# Patient Record
Sex: FEMALE | Race: WHITE | NOT HISPANIC OR LATINO | URBAN - METROPOLITAN AREA
[De-identification: names, ages, dates, MRNs, and addresses within clinical notes are randomized per-mention and may not be internally consistent; named-entity substitution may affect disease eponyms.]

---

## 2023-08-10 ENCOUNTER — HOSPITAL ENCOUNTER (OUTPATIENT)
Dept: DATA CONVERSION | Facility: HOSPITAL | Age: 78
Discharge: HOME | End: 2023-08-10

## 2023-08-10 DIAGNOSIS — M85.89 OTHER SPECIFIED DISORDERS OF BONE DENSITY AND STRUCTURE, MULTIPLE SITES: ICD-10-CM

## 2023-08-22 ENCOUNTER — HOSPITAL ENCOUNTER (OUTPATIENT)
Dept: DATA CONVERSION | Facility: HOSPITAL | Age: 78
Discharge: HOME | End: 2023-08-22

## 2023-08-22 DIAGNOSIS — E78.5 HYPERLIPIDEMIA, UNSPECIFIED: ICD-10-CM

## 2023-08-22 LAB
ALBUMIN SERPL-MCNC: 4.3 GM/DL (ref 3.5–5)
ALBUMIN/GLOB SERPL: 1.3 RATIO (ref 1.5–3)
ALP BLD-CCNC: 91 U/L (ref 35–125)
ALT SERPL-CCNC: 12 U/L (ref 5–40)
ANION GAP SERPL CALCULATED.3IONS-SCNC: 14 MMOL/L (ref 0–19)
APPEARANCE PLAS: CLEAR
AST SERPL-CCNC: 22 U/L (ref 5–40)
BILIRUB SERPL-MCNC: 1 MG/DL (ref 0.1–1.2)
BUN SERPL-MCNC: 10 MG/DL (ref 8–25)
BUN/CREAT SERPL: 16.7 RATIO (ref 8–21)
CALCIUM SERPL-MCNC: 8.9 MG/DL (ref 8.5–10.4)
CHLORIDE SERPL-SCNC: 107 MMOL/L (ref 97–107)
CHOLEST SERPL-MCNC: 173 MG/DL (ref 133–200)
CHOLEST/HDLC SERPL: 2.7 RATIO
CO2 SERPL-SCNC: 22 MMOL/L (ref 24–31)
COLOR SPUN FLD: YELLOW
CREAT SERPL-MCNC: 0.6 MG/DL (ref 0.4–1.6)
FASTING STATUS PATIENT QL REPORTED: NORMAL
GFR SERPL CREATININE-BSD FRML MDRD: 92 ML/MIN/1.73 M2
GLOBULIN SER-MCNC: 3.3 G/DL (ref 1.9–3.7)
GLUCOSE SERPL-MCNC: 91 MG/DL (ref 65–99)
HDLC SERPL-MCNC: 65 MG/DL
LDLC SERPL CALC-MCNC: 93 MG/DL (ref 65–130)
POTASSIUM SERPL-SCNC: 4.2 MMOL/L (ref 3.4–5.1)
PROT SERPL-MCNC: 7.6 G/DL (ref 5.9–7.9)
SODIUM SERPL-SCNC: 143 MMOL/L (ref 133–145)
TRIGL SERPL-MCNC: 77 MG/DL (ref 40–150)

## 2023-10-27 DIAGNOSIS — M54.9 BACK PAIN, UNSPECIFIED BACK LOCATION, UNSPECIFIED BACK PAIN LATERALITY, UNSPECIFIED CHRONICITY: Primary | ICD-10-CM

## 2023-10-27 RX ORDER — TRAMADOL HYDROCHLORIDE 50 MG/1
50 TABLET ORAL EVERY 12 HOURS PRN
COMMUNITY
End: 2023-10-27 | Stop reason: SDUPTHER

## 2023-10-27 RX ORDER — TRAMADOL HYDROCHLORIDE 50 MG/1
50 TABLET ORAL EVERY 12 HOURS PRN
Qty: 60 TABLET | Refills: 0 | Status: SHIPPED | OUTPATIENT
Start: 2023-10-27 | End: 2023-12-01 | Stop reason: SDUPTHER

## 2023-12-01 DIAGNOSIS — M54.9 BACK PAIN, UNSPECIFIED BACK LOCATION, UNSPECIFIED BACK PAIN LATERALITY, UNSPECIFIED CHRONICITY: ICD-10-CM

## 2023-12-01 RX ORDER — TRAMADOL HYDROCHLORIDE 50 MG/1
50 TABLET ORAL EVERY 12 HOURS PRN
Qty: 60 TABLET | Refills: 0 | Status: SHIPPED | OUTPATIENT
Start: 2023-12-01 | End: 2023-12-08 | Stop reason: SDUPTHER

## 2023-12-08 ENCOUNTER — OFFICE VISIT (OUTPATIENT)
Dept: PRIMARY CARE | Facility: CLINIC | Age: 78
End: 2023-12-08
Payer: MEDICARE

## 2023-12-08 VITALS
WEIGHT: 163.2 LBS | HEIGHT: 63 IN | DIASTOLIC BLOOD PRESSURE: 70 MMHG | BODY MASS INDEX: 28.92 KG/M2 | OXYGEN SATURATION: 98 % | SYSTOLIC BLOOD PRESSURE: 152 MMHG | HEART RATE: 78 BPM

## 2023-12-08 DIAGNOSIS — M54.9 BACK PAIN, UNSPECIFIED BACK LOCATION, UNSPECIFIED BACK PAIN LATERALITY, UNSPECIFIED CHRONICITY: ICD-10-CM

## 2023-12-08 DIAGNOSIS — I10 BENIGN ESSENTIAL HYPERTENSION: ICD-10-CM

## 2023-12-08 DIAGNOSIS — M17.0 PRIMARY OSTEOARTHRITIS OF BOTH KNEES: Primary | ICD-10-CM

## 2023-12-08 PROCEDURE — 1159F MED LIST DOCD IN RCRD: CPT | Performed by: PHYSICIAN ASSISTANT

## 2023-12-08 PROCEDURE — 99213 OFFICE O/P EST LOW 20 MIN: CPT | Performed by: PHYSICIAN ASSISTANT

## 2023-12-08 PROCEDURE — 1126F AMNT PAIN NOTED NONE PRSNT: CPT | Performed by: PHYSICIAN ASSISTANT

## 2023-12-08 PROCEDURE — 3077F SYST BP >= 140 MM HG: CPT | Performed by: PHYSICIAN ASSISTANT

## 2023-12-08 PROCEDURE — 1036F TOBACCO NON-USER: CPT | Performed by: PHYSICIAN ASSISTANT

## 2023-12-08 PROCEDURE — 3078F DIAST BP <80 MM HG: CPT | Performed by: PHYSICIAN ASSISTANT

## 2023-12-08 RX ORDER — SIMVASTATIN 10 MG/1
TABLET, FILM COATED ORAL
COMMUNITY
End: 2024-03-21

## 2023-12-08 RX ORDER — TRAMADOL HYDROCHLORIDE 50 MG/1
50 TABLET ORAL EVERY 12 HOURS PRN
Qty: 60 TABLET | Refills: 1 | Status: SHIPPED | OUTPATIENT
Start: 2023-12-08 | End: 2024-01-02 | Stop reason: SDUPTHER

## 2023-12-08 RX ORDER — AMLODIPINE BESYLATE 10 MG/1
TABLET ORAL EVERY 24 HOURS
COMMUNITY
End: 2024-02-26

## 2023-12-08 RX ORDER — ALENDRONATE SODIUM 70 MG/1
TABLET ORAL
COMMUNITY

## 2023-12-08 ASSESSMENT — PAIN SCALES - GENERAL: PAINLEVEL: 0-NO PAIN

## 2023-12-08 ASSESSMENT — PATIENT HEALTH QUESTIONNAIRE - PHQ9
2. FEELING DOWN, DEPRESSED OR HOPELESS: NOT AT ALL
1. LITTLE INTEREST OR PLEASURE IN DOING THINGS: NOT AT ALL
SUM OF ALL RESPONSES TO PHQ9 QUESTIONS 1 AND 2: 0

## 2023-12-08 NOTE — PROGRESS NOTES
"Subjective   Patient ID: Kate Bearden is a 78 y.o. female who presents for med refills.    Presents for follow up - refill tramadol. States that she has more pain with rain or weather changes. Has been stable on same dose several years prior to my care as well.   Denies any other changes.   BP at home more stable though she has not checked recently.         Review of Systems   All other systems reviewed and are negative.      Objective   /70   Pulse 78   Ht 1.6 m (5' 3\")   Wt 74 kg (163 lb 3.2 oz)   SpO2 98%   BMI 28.91 kg/m²     Physical Exam  Constitutional:       Appearance: Normal appearance.   HENT:      Head: Normocephalic.      Mouth/Throat:      Pharynx: Oropharynx is clear.   Eyes:      Extraocular Movements: Extraocular movements intact.      Pupils: Pupils are equal, round, and reactive to light.   Cardiovascular:      Rate and Rhythm: Normal rate and regular rhythm.   Pulmonary:      Breath sounds: Normal breath sounds.   Neurological:      Mental Status: She is alert.         Assessment/Plan   Diagnoses and all orders for this visit:  Primary osteoarthritis of both knees  Benign essential hypertension  Back pain, unspecified back location, unspecified back pain laterality, unspecified chronicity         "

## 2024-01-02 DIAGNOSIS — M54.9 BACK PAIN, UNSPECIFIED BACK LOCATION, UNSPECIFIED BACK PAIN LATERALITY, UNSPECIFIED CHRONICITY: ICD-10-CM

## 2024-01-02 RX ORDER — TRAMADOL HYDROCHLORIDE 50 MG/1
50 TABLET ORAL EVERY 12 HOURS PRN
Qty: 60 TABLET | Refills: 1 | Status: SHIPPED | OUTPATIENT
Start: 2024-01-02 | End: 2024-02-29 | Stop reason: SDUPTHER

## 2024-01-02 NOTE — TELEPHONE ENCOUNTER
Pt called stating her previous Rx request was canceled - she states she has been out of medication for x5 days - she states she is in a lot of pain, especially when it rains - she is requesting refill asap

## 2024-02-23 DIAGNOSIS — I10 BENIGN ESSENTIAL HYPERTENSION: Primary | ICD-10-CM

## 2024-02-26 RX ORDER — AMLODIPINE BESYLATE 10 MG/1
10 TABLET ORAL DAILY
Qty: 100 TABLET | Refills: 2 | Status: SHIPPED | OUTPATIENT
Start: 2024-02-26

## 2024-02-29 DIAGNOSIS — M54.9 BACK PAIN, UNSPECIFIED BACK LOCATION, UNSPECIFIED BACK PAIN LATERALITY, UNSPECIFIED CHRONICITY: ICD-10-CM

## 2024-03-01 RX ORDER — TRAMADOL HYDROCHLORIDE 50 MG/1
50 TABLET ORAL EVERY 12 HOURS PRN
Qty: 60 TABLET | Refills: 0 | Status: SHIPPED | OUTPATIENT
Start: 2024-03-01 | End: 2024-03-28 | Stop reason: SDUPTHER

## 2024-03-01 NOTE — TELEPHONE ENCOUNTER
She's due for visit for further fills - unable to send electronically. Ok to call in or can print and

## 2024-03-21 DIAGNOSIS — E78.1 PURE HYPERTRIGLYCERIDEMIA: Primary | ICD-10-CM

## 2024-03-21 RX ORDER — SIMVASTATIN 10 MG/1
10 TABLET, FILM COATED ORAL EVERY EVENING
Qty: 100 TABLET | Refills: 2 | Status: SHIPPED | OUTPATIENT
Start: 2024-03-21

## 2024-03-28 DIAGNOSIS — M54.9 BACK PAIN, UNSPECIFIED BACK LOCATION, UNSPECIFIED BACK PAIN LATERALITY, UNSPECIFIED CHRONICITY: ICD-10-CM

## 2024-03-29 RX ORDER — TRAMADOL HYDROCHLORIDE 50 MG/1
50 TABLET ORAL EVERY 12 HOURS PRN
Qty: 60 TABLET | Refills: 0 | Status: SHIPPED | OUTPATIENT
Start: 2024-03-29 | End: 2024-04-26 | Stop reason: SDUPTHER

## 2024-04-26 DIAGNOSIS — M54.9 BACK PAIN, UNSPECIFIED BACK LOCATION, UNSPECIFIED BACK PAIN LATERALITY, UNSPECIFIED CHRONICITY: ICD-10-CM

## 2024-04-26 RX ORDER — TRAMADOL HYDROCHLORIDE 50 MG/1
50 TABLET ORAL EVERY 12 HOURS PRN
Qty: 60 TABLET | Refills: 0 | Status: SHIPPED | OUTPATIENT
Start: 2024-04-26 | End: 2024-05-13 | Stop reason: SDUPTHER

## 2024-05-13 ENCOUNTER — OFFICE VISIT (OUTPATIENT)
Dept: PRIMARY CARE | Facility: CLINIC | Age: 79
End: 2024-05-13
Payer: MEDICARE

## 2024-05-13 VITALS
HEART RATE: 73 BPM | BODY MASS INDEX: 31.83 KG/M2 | SYSTOLIC BLOOD PRESSURE: 134 MMHG | OXYGEN SATURATION: 95 % | DIASTOLIC BLOOD PRESSURE: 70 MMHG | WEIGHT: 173 LBS | HEIGHT: 62 IN

## 2024-05-13 DIAGNOSIS — M17.0 PRIMARY OSTEOARTHRITIS OF BOTH KNEES: ICD-10-CM

## 2024-05-13 DIAGNOSIS — I10 BENIGN ESSENTIAL HYPERTENSION: ICD-10-CM

## 2024-05-13 DIAGNOSIS — M54.9 BACK PAIN, UNSPECIFIED BACK LOCATION, UNSPECIFIED BACK PAIN LATERALITY, UNSPECIFIED CHRONICITY: Primary | ICD-10-CM

## 2024-05-13 DIAGNOSIS — E78.00 PURE HYPERCHOLESTEROLEMIA: ICD-10-CM

## 2024-05-13 PROCEDURE — 99214 OFFICE O/P EST MOD 30 MIN: CPT | Performed by: PHYSICIAN ASSISTANT

## 2024-05-13 PROCEDURE — 3078F DIAST BP <80 MM HG: CPT | Performed by: PHYSICIAN ASSISTANT

## 2024-05-13 PROCEDURE — 1160F RVW MEDS BY RX/DR IN RCRD: CPT | Performed by: PHYSICIAN ASSISTANT

## 2024-05-13 PROCEDURE — 1159F MED LIST DOCD IN RCRD: CPT | Performed by: PHYSICIAN ASSISTANT

## 2024-05-13 PROCEDURE — 3075F SYST BP GE 130 - 139MM HG: CPT | Performed by: PHYSICIAN ASSISTANT

## 2024-05-13 PROCEDURE — 1036F TOBACCO NON-USER: CPT | Performed by: PHYSICIAN ASSISTANT

## 2024-05-13 PROCEDURE — 1126F AMNT PAIN NOTED NONE PRSNT: CPT | Performed by: PHYSICIAN ASSISTANT

## 2024-05-13 PROCEDURE — 1158F ADVNC CARE PLAN TLK DOCD: CPT | Performed by: PHYSICIAN ASSISTANT

## 2024-05-13 PROCEDURE — 1123F ACP DISCUSS/DSCN MKR DOCD: CPT | Performed by: PHYSICIAN ASSISTANT

## 2024-05-13 RX ORDER — TRAMADOL HYDROCHLORIDE 50 MG/1
50 TABLET ORAL EVERY 12 HOURS PRN
Qty: 60 TABLET | Refills: 2 | Status: SHIPPED | OUTPATIENT
Start: 2024-05-25

## 2024-05-13 ASSESSMENT — PATIENT HEALTH QUESTIONNAIRE - PHQ9
SUM OF ALL RESPONSES TO PHQ9 QUESTIONS 1 AND 2: 0
1. LITTLE INTEREST OR PLEASURE IN DOING THINGS: NOT AT ALL
2. FEELING DOWN, DEPRESSED OR HOPELESS: NOT AT ALL

## 2024-05-13 ASSESSMENT — PAIN SCALES - GENERAL: PAINLEVEL: 0-NO PAIN

## 2024-05-13 NOTE — PROGRESS NOTES
"Subjective   Patient ID: Kate Bearden is a 78 y.o. female who presents for Follow-up and Med Refill.    Presents for follow up - refill tramadol. States that she has more pain with rain or weather changes. Has been stable on same dose several years prior to my care as well.   Denies any other changes.   BP at home more stable though she has not checked recently.  Tolerating alendronate well.     Med Refill         Review of Systems   All other systems reviewed and are negative.      Objective   /70   Pulse 73   Ht 1.575 m (5' 2\")   Wt 78.5 kg (173 lb)   SpO2 95%   BMI 31.64 kg/m²     Physical Exam  Constitutional:       Appearance: Normal appearance.   HENT:      Head: Normocephalic and atraumatic.      Mouth/Throat:      Pharynx: Oropharynx is clear.   Cardiovascular:      Rate and Rhythm: Normal rate and regular rhythm.      Heart sounds: Normal heart sounds.   Pulmonary:      Breath sounds: Normal breath sounds.   Neurological:      Mental Status: She is alert.         Assessment/Plan   Diagnoses and all orders for this visit:  Back pain, unspecified back location, unspecified back pain laterality, unspecified chronicity  -     traMADol (Ultram) 50 mg tablet; Take 1 tablet (50 mg) by mouth every 12 hours if needed for moderate pain (4 - 6). Do not fill before May 25, 2024.  Primary osteoarthritis of both knees  Benign essential hypertension  Pure hypercholesterolemia  -     Lipid panel; Future  -     Comprehensive metabolic panel; Future         "

## 2024-07-05 DIAGNOSIS — M81.8 OTHER OSTEOPOROSIS WITHOUT CURRENT PATHOLOGICAL FRACTURE: Primary | ICD-10-CM

## 2024-07-08 RX ORDER — ALENDRONATE SODIUM 70 MG/1
TABLET ORAL
Qty: 4 TABLET | Refills: 11 | Status: SHIPPED | OUTPATIENT
Start: 2024-07-08

## 2024-08-13 ENCOUNTER — LAB (OUTPATIENT)
Dept: LAB | Facility: LAB | Age: 79
End: 2024-08-13
Payer: MEDICARE

## 2024-08-13 DIAGNOSIS — E78.00 PURE HYPERCHOLESTEROLEMIA: ICD-10-CM

## 2024-08-13 LAB
ALBUMIN SERPL BCP-MCNC: 4.5 G/DL (ref 3.4–5)
ALP SERPL-CCNC: 63 U/L (ref 33–136)
ALT SERPL W P-5'-P-CCNC: 15 U/L (ref 7–45)
ANION GAP SERPL CALC-SCNC: 13 MMOL/L (ref 10–20)
AST SERPL W P-5'-P-CCNC: 19 U/L (ref 9–39)
BILIRUB SERPL-MCNC: 1.2 MG/DL (ref 0–1.2)
BUN SERPL-MCNC: 13 MG/DL (ref 6–23)
CALCIUM SERPL-MCNC: 9.1 MG/DL (ref 8.6–10.3)
CHLORIDE SERPL-SCNC: 107 MMOL/L (ref 98–107)
CHOLEST SERPL-MCNC: 141 MG/DL (ref 0–199)
CHOLESTEROL/HDL RATIO: 2.5
CO2 SERPL-SCNC: 27 MMOL/L (ref 21–32)
CREAT SERPL-MCNC: 0.7 MG/DL (ref 0.5–1.05)
EGFRCR SERPLBLD CKD-EPI 2021: 89 ML/MIN/1.73M*2
GLUCOSE SERPL-MCNC: 92 MG/DL (ref 74–99)
HDLC SERPL-MCNC: 55.6 MG/DL
LDLC SERPL CALC-MCNC: 67 MG/DL
NON HDL CHOLESTEROL: 85 MG/DL (ref 0–149)
POTASSIUM SERPL-SCNC: 4.2 MMOL/L (ref 3.5–5.3)
PROT SERPL-MCNC: 7.3 G/DL (ref 6.4–8.2)
SODIUM SERPL-SCNC: 143 MMOL/L (ref 136–145)
TRIGL SERPL-MCNC: 91 MG/DL (ref 0–149)
VLDL: 18 MG/DL (ref 0–40)

## 2024-08-13 PROCEDURE — 36415 COLL VENOUS BLD VENIPUNCTURE: CPT

## 2024-08-13 PROCEDURE — 80061 LIPID PANEL: CPT

## 2024-08-13 PROCEDURE — 80053 COMPREHEN METABOLIC PANEL: CPT

## 2024-08-28 DIAGNOSIS — M54.9 BACK PAIN, UNSPECIFIED BACK LOCATION, UNSPECIFIED BACK PAIN LATERALITY, UNSPECIFIED CHRONICITY: ICD-10-CM

## 2024-08-30 RX ORDER — TRAMADOL HYDROCHLORIDE 50 MG/1
50 TABLET ORAL EVERY 12 HOURS PRN
Qty: 60 TABLET | Refills: 0 | Status: SHIPPED | OUTPATIENT
Start: 2024-08-30

## 2024-09-27 ENCOUNTER — CLINICAL SUPPORT (OUTPATIENT)
Dept: PRIMARY CARE | Facility: CLINIC | Age: 79
End: 2024-09-27
Payer: MEDICARE

## 2024-09-27 DIAGNOSIS — Z23 ENCOUNTER FOR IMMUNIZATION: ICD-10-CM

## 2024-09-27 PROCEDURE — 90662 IIV NO PRSV INCREASED AG IM: CPT | Performed by: PHYSICIAN ASSISTANT

## 2024-09-30 DIAGNOSIS — M54.9 BACK PAIN, UNSPECIFIED BACK LOCATION, UNSPECIFIED BACK PAIN LATERALITY, UNSPECIFIED CHRONICITY: ICD-10-CM

## 2024-09-30 RX ORDER — TRAMADOL HYDROCHLORIDE 50 MG/1
50 TABLET ORAL EVERY 12 HOURS PRN
Qty: 60 TABLET | Refills: 0 | Status: SHIPPED | OUTPATIENT
Start: 2024-09-30

## 2024-10-30 DIAGNOSIS — M54.9 BACK PAIN, UNSPECIFIED BACK LOCATION, UNSPECIFIED BACK PAIN LATERALITY, UNSPECIFIED CHRONICITY: ICD-10-CM

## 2024-11-01 RX ORDER — TRAMADOL HYDROCHLORIDE 50 MG/1
50 TABLET ORAL EVERY 12 HOURS PRN
Qty: 60 TABLET | Refills: 0 | Status: SHIPPED | OUTPATIENT
Start: 2024-11-01

## 2024-11-04 DIAGNOSIS — I10 BENIGN ESSENTIAL HYPERTENSION: ICD-10-CM

## 2024-11-05 RX ORDER — AMLODIPINE BESYLATE 10 MG/1
10 TABLET ORAL DAILY
Qty: 100 TABLET | Refills: 2 | Status: SHIPPED | OUTPATIENT
Start: 2024-11-05

## 2024-11-21 ENCOUNTER — OFFICE VISIT (OUTPATIENT)
Dept: PRIMARY CARE | Facility: CLINIC | Age: 79
End: 2024-11-21
Payer: MEDICARE

## 2024-11-21 VITALS
HEIGHT: 63 IN | SYSTOLIC BLOOD PRESSURE: 138 MMHG | HEART RATE: 75 BPM | DIASTOLIC BLOOD PRESSURE: 80 MMHG | WEIGHT: 177 LBS | OXYGEN SATURATION: 95 % | BODY MASS INDEX: 31.36 KG/M2

## 2024-11-21 DIAGNOSIS — M54.9 BACK PAIN, UNSPECIFIED BACK LOCATION, UNSPECIFIED BACK PAIN LATERALITY, UNSPECIFIED CHRONICITY: ICD-10-CM

## 2024-11-21 DIAGNOSIS — M17.0 PRIMARY OSTEOARTHRITIS OF BOTH KNEES: Primary | ICD-10-CM

## 2024-11-21 PROCEDURE — 3075F SYST BP GE 130 - 139MM HG: CPT | Performed by: PHYSICIAN ASSISTANT

## 2024-11-21 PROCEDURE — 1123F ACP DISCUSS/DSCN MKR DOCD: CPT | Performed by: PHYSICIAN ASSISTANT

## 2024-11-21 PROCEDURE — 99213 OFFICE O/P EST LOW 20 MIN: CPT | Performed by: PHYSICIAN ASSISTANT

## 2024-11-21 PROCEDURE — 1126F AMNT PAIN NOTED NONE PRSNT: CPT | Performed by: PHYSICIAN ASSISTANT

## 2024-11-21 PROCEDURE — 3079F DIAST BP 80-89 MM HG: CPT | Performed by: PHYSICIAN ASSISTANT

## 2024-11-21 PROCEDURE — 1036F TOBACCO NON-USER: CPT | Performed by: PHYSICIAN ASSISTANT

## 2024-11-21 PROCEDURE — 1159F MED LIST DOCD IN RCRD: CPT | Performed by: PHYSICIAN ASSISTANT

## 2024-11-21 PROCEDURE — 1158F ADVNC CARE PLAN TLK DOCD: CPT | Performed by: PHYSICIAN ASSISTANT

## 2024-11-21 RX ORDER — TRAMADOL HYDROCHLORIDE 50 MG/1
50 TABLET ORAL EVERY 12 HOURS PRN
Qty: 60 TABLET | Refills: 2 | Status: SHIPPED | OUTPATIENT
Start: 2024-11-21

## 2024-11-21 ASSESSMENT — PAIN SCALES - GENERAL: PAINLEVEL_OUTOF10: 0-NO PAIN

## 2024-11-21 NOTE — PROGRESS NOTES
"Subjective   Patient ID: Kate Bearden is a 79 y.o. female who presents for Med Refill.    Presents for follow up - refill tramadol. States that she has more pain with rain or weather changes. Has been stable on same dose several years prior to my care as well.   Denies any other changes.   BP at home more stable though she has not checked recently.  Tolerating alendronate well.     Med Refill         Review of Systems   All other systems reviewed and are negative.      Objective   /80   Pulse 75   Ht 1.6 m (5' 3\")   Wt 80.3 kg (177 lb)   SpO2 95%   BMI 31.35 kg/m²     Physical Exam  Constitutional:       Appearance: Normal appearance.   Cardiovascular:      Rate and Rhythm: Normal rate and regular rhythm.   Pulmonary:      Breath sounds: Normal breath sounds.   Neurological:      Mental Status: She is alert.         Assessment/Plan   Diagnoses and all orders for this visit:  Primary osteoarthritis of both knees  Back pain, unspecified back location, unspecified back pain laterality, unspecified chronicity  -     traMADol (Ultram) 50 mg tablet; Take 1 tablet (50 mg) by mouth every 12 hours if needed for moderate pain (4 - 6).    3 month AWV     "

## 2024-12-02 DIAGNOSIS — E78.1 PURE HYPERTRIGLYCERIDEMIA: ICD-10-CM

## 2024-12-03 RX ORDER — SIMVASTATIN 10 MG/1
10 TABLET, FILM COATED ORAL EVERY EVENING
Qty: 100 TABLET | Refills: 2 | Status: SHIPPED | OUTPATIENT
Start: 2024-12-03

## 2025-01-27 DIAGNOSIS — E78.1 PURE HYPERTRIGLYCERIDEMIA: ICD-10-CM

## 2025-01-27 DIAGNOSIS — I10 BENIGN ESSENTIAL HYPERTENSION: ICD-10-CM

## 2025-01-27 RX ORDER — SIMVASTATIN 10 MG/1
10 TABLET, FILM COATED ORAL EVERY EVENING
Qty: 100 TABLET | Refills: 2 | Status: SHIPPED | OUTPATIENT
Start: 2025-01-27

## 2025-01-27 RX ORDER — AMLODIPINE BESYLATE 10 MG/1
10 TABLET ORAL DAILY
Qty: 100 TABLET | Refills: 2 | Status: SHIPPED | OUTPATIENT
Start: 2025-01-27

## 2025-02-14 ENCOUNTER — TELEPHONE (OUTPATIENT)
Dept: PRIMARY CARE | Facility: CLINIC | Age: 80
End: 2025-02-14
Payer: MEDICARE

## 2025-02-14 DIAGNOSIS — M54.9 BACK PAIN, UNSPECIFIED BACK LOCATION, UNSPECIFIED BACK PAIN LATERALITY, UNSPECIFIED CHRONICITY: ICD-10-CM

## 2025-02-14 RX ORDER — TRAMADOL HYDROCHLORIDE 50 MG/1
50 TABLET ORAL EVERY 12 HOURS PRN
Qty: 60 TABLET | Refills: 0 | Status: SHIPPED | OUTPATIENT
Start: 2025-02-14

## 2025-03-05 ENCOUNTER — OFFICE VISIT (OUTPATIENT)
Dept: PRIMARY CARE | Facility: CLINIC | Age: 80
End: 2025-03-05
Payer: MEDICARE

## 2025-03-05 VITALS
HEART RATE: 122 BPM | DIASTOLIC BLOOD PRESSURE: 80 MMHG | HEIGHT: 63 IN | SYSTOLIC BLOOD PRESSURE: 136 MMHG | WEIGHT: 166 LBS | BODY MASS INDEX: 29.41 KG/M2 | OXYGEN SATURATION: 97 %

## 2025-03-05 DIAGNOSIS — M53.3 CHRONIC LEFT SI JOINT PAIN: ICD-10-CM

## 2025-03-05 DIAGNOSIS — E78.00 PURE HYPERCHOLESTEROLEMIA: ICD-10-CM

## 2025-03-05 DIAGNOSIS — I10 BENIGN ESSENTIAL HYPERTENSION: ICD-10-CM

## 2025-03-05 DIAGNOSIS — G89.29 CHRONIC LEFT SI JOINT PAIN: ICD-10-CM

## 2025-03-05 DIAGNOSIS — Z00.00 ROUTINE GENERAL MEDICAL EXAMINATION AT HEALTH CARE FACILITY: Primary | ICD-10-CM

## 2025-03-05 DIAGNOSIS — M54.9 BACK PAIN, UNSPECIFIED BACK LOCATION, UNSPECIFIED BACK PAIN LATERALITY, UNSPECIFIED CHRONICITY: ICD-10-CM

## 2025-03-05 DIAGNOSIS — M17.0 PRIMARY OSTEOARTHRITIS OF BOTH KNEES: ICD-10-CM

## 2025-03-05 PROCEDURE — 1126F AMNT PAIN NOTED NONE PRSNT: CPT | Performed by: PHYSICIAN ASSISTANT

## 2025-03-05 PROCEDURE — 1036F TOBACCO NON-USER: CPT | Performed by: PHYSICIAN ASSISTANT

## 2025-03-05 PROCEDURE — 1159F MED LIST DOCD IN RCRD: CPT | Performed by: PHYSICIAN ASSISTANT

## 2025-03-05 PROCEDURE — 99214 OFFICE O/P EST MOD 30 MIN: CPT | Performed by: PHYSICIAN ASSISTANT

## 2025-03-05 PROCEDURE — 1123F ACP DISCUSS/DSCN MKR DOCD: CPT | Performed by: PHYSICIAN ASSISTANT

## 2025-03-05 PROCEDURE — 99215 OFFICE O/P EST HI 40 MIN: CPT | Mod: 25 | Performed by: PHYSICIAN ASSISTANT

## 2025-03-05 PROCEDURE — 3079F DIAST BP 80-89 MM HG: CPT | Performed by: PHYSICIAN ASSISTANT

## 2025-03-05 PROCEDURE — 3075F SYST BP GE 130 - 139MM HG: CPT | Performed by: PHYSICIAN ASSISTANT

## 2025-03-05 PROCEDURE — 1170F FXNL STATUS ASSESSED: CPT | Performed by: PHYSICIAN ASSISTANT

## 2025-03-05 PROCEDURE — G0439 PPPS, SUBSEQ VISIT: HCPCS | Performed by: PHYSICIAN ASSISTANT

## 2025-03-05 RX ORDER — TRAMADOL HYDROCHLORIDE 50 MG/1
50 TABLET ORAL EVERY 12 HOURS PRN
Qty: 60 TABLET | Refills: 2 | Status: SHIPPED | OUTPATIENT
Start: 2025-03-13

## 2025-03-05 ASSESSMENT — ACTIVITIES OF DAILY LIVING (ADL)
BATHING: INDEPENDENT
TOILETING: INDEPENDENT
DRESSING: INDEPENDENT
EATING: INDEPENDENT
GROCERY_SHOPPING: INDEPENDENT
NEEDS ASSISTANCE WITH FOOD: INDEPENDENT
JUDGMENT_ADEQUATE_SAFELY_COMPLETE_DAILY_ACTIVITIES: YES
USING TELEPHONE: INDEPENDENT
MANAGING_FINANCES: INDEPENDENT
STIL DRIVING: YES
ADEQUATE_TO_COMPLETE_ADL: YES
USING TRANSPORTATION: INDEPENDENT
DOING_HOUSEWORK: INDEPENDENT
TAKING_MEDICATION: INDEPENDENT
FEEDING: INDEPENDENT
PREPARING MEALS: INDEPENDENT

## 2025-03-05 ASSESSMENT — PATIENT HEALTH QUESTIONNAIRE - PHQ9
1. LITTLE INTEREST OR PLEASURE IN DOING THINGS: NOT AT ALL
SUM OF ALL RESPONSES TO PHQ9 QUESTIONS 1 AND 2: 0
2. FEELING DOWN, DEPRESSED OR HOPELESS: NOT AT ALL

## 2025-03-05 ASSESSMENT — COGNITIVE AND FUNCTIONAL STATUS - GENERAL: VERBAL FLUENCY - ANIMAL NAMES (0 TO 25): 3

## 2025-03-05 ASSESSMENT — PAIN SCALES - GENERAL: PAINLEVEL_OUTOF10: 0-NO PAIN

## 2025-03-05 NOTE — PROGRESS NOTES
Subjective   Reason for Visit: Kate Bearden is an 79 y.o. female here for a Medicare Wellness visit.     Past Medical, Surgical, and Family History reviewed and updated in chart.    Reviewed all medications by prescribing practitioner or clinical pharmacist (such as prescriptions, OTCs, herbal therapies and supplements) and documented in the medical record.    [facility]    ---      Kate Bearden is a 79 y.o. female that is presenting today for Annual Exam.    Concerns: ongoing L lower back pain    Subjective: pain to L lower back SI joint region.     Other Providers: none    Hearing Changes: no    Cognitive Assessment: mini-cog    Depression Screening: negative     ACTIVITIES OF DAILY LIVING:  Basic ADLs:  Problems with Bathing, Dressing, Toileting, Transferring, Continence, Feeding No  Instrumental ADLs:  No problems with Ability to use phone, Shopping, Cooking, House-keeping, Laundry, Transportation, Medication Management, Finance Management No    Advanced Care Planning was discussed with patient:  The patient has an active advanced care plan on file. The patient has an active surrogate decision-maker on file.    History   History reviewed. No pertinent past medical history.  History reviewed. No pertinent surgical history.  No family history on file.    No Known Allergies  Current Outpatient Medications on File Prior to Visit:  alendronate (Fosamax) 70 mg tablet, TAKE 1 TABLET BY MOUTH 30 MINUTES BEFORE THE 1ST FOOD , BEVERAGE OR MEDICINE OF THE DAY WITH PLAIN WATER as directed, Disp: 4 tablet, Rfl: 11  amLODIPine (Norvasc) 10 mg tablet, Take 1 tablet (10 mg) by mouth once daily., Disp: 100 tablet, Rfl: 2  simvastatin (Zocor) 10 mg tablet, Take 1 tablet (10 mg) by mouth once daily in the evening., Disp: 100 tablet, Rfl: 2  traMADol (Ultram) 50 mg tablet, Take 1 tablet (50 mg) by mouth every 12 hours if needed for moderate pain (4 - 6)., Disp:  "60 tablet, Rfl: 0    No current facility-administered medications on file prior to visit.      Immunization History  Administered            Date(s) Administered    Flu vaccine, trivalent, preservative free, HIGH-DOSE, age 65y+ (Fluzone)                          09/27/2024      Moderna SARS-CoV-2 Vaccination                          03/19/2021  04/15/2021  06/13/2022    Patient's medical history was reviewed and updated either before or during this encounter.    Objective  --------------------            03/05/25               1014     --------------------   BP:       136/80     Pulse:   (!) 122     SpO2:      97%      --------------------   [unfilled]  Mobility Assessment: get up and go test <30 seconds- Yes.       Assessment/Plan   Diagnoses and associated orders for this visit:    · Routine general medical examination at health care facility   1 Year Follow Up In Primary Care - Wellness Exam; Future    Patient Active Problem List:     Back pain     Primary osteoarthritis of both knees     Benign essential hypertension     Pure hypercholesterolemia    Advance Care Planning  Diagnoses and associated orders for this visit:    · Routine general medical examination at [facility] care facility   1 Year Follow Up In Primary Care - Wellness Exam; Future    The patient was encouraged to ensure that any/all documentation is accurate and up to date, and that our office be provided a copy in the event that anything changes.    Lokesh Macias PA-C         Patient Care Team:  Lokesh Macias PA-C as PCP - General (Family Medicine)  Lokesh Macias PA-C as Primary Care Provider     Review of Systems   All other systems reviewed and are negative.      Objective   Vitals:  /80   Pulse (!) 122   Ht 1.6 m (5' 3\")   Wt 75.3 kg (166 lb)   SpO2 97%   BMI 29.41 kg/m²       Physical Exam  Constitutional:       Appearance: Normal appearance.   HENT:      Mouth/Throat:      Pharynx: Oropharynx is " clear.   Eyes:      Pupils: Pupils are equal, round, and reactive to light.   Cardiovascular:      Rate and Rhythm: Normal rate and regular rhythm.      Heart sounds: Normal heart sounds.   Pulmonary:      Breath sounds: Normal breath sounds.   Musculoskeletal:      Comments: L SI joint and L lateral lower back tenderness   Neurological:      Mental Status: She is alert.         Assessment & Plan  Routine general medical examination at health care facility    Orders:    1 Year Follow Up In Primary Care - Wellness Exam; Future    Back pain, unspecified back location, unspecified back pain laterality, unspecified chronicity    Orders:    Referral to Pain Medicine; Future    traMADol (Ultram) 50 mg tablet; Take 1 tablet (50 mg) by mouth every 12 hours if needed for moderate pain (4 - 6). Do not fill before March 13, 2025.    Primary osteoarthritis of both knees         Pure hypercholesterolemia         Benign essential hypertension         Chronic left SI joint pain    Orders:    Referral to Pain Medicine; Future       Follow up in 3 months.

## 2025-03-05 NOTE — ASSESSMENT & PLAN NOTE
Orders:    Referral to Pain Medicine; Future    traMADol (Ultram) 50 mg tablet; Take 1 tablet (50 mg) by mouth every 12 hours if needed for moderate pain (4 - 6). Do not fill before March 13, 2025.

## 2025-04-14 ENCOUNTER — HOSPITAL ENCOUNTER (OUTPATIENT)
Dept: RADIOLOGY | Facility: CLINIC | Age: 80
Discharge: HOME | End: 2025-04-14
Payer: MEDICARE

## 2025-04-14 ENCOUNTER — OFFICE VISIT (OUTPATIENT)
Dept: PAIN MEDICINE | Facility: CLINIC | Age: 80
End: 2025-04-14
Payer: MEDICARE

## 2025-04-14 VITALS — HEART RATE: 110 BPM | SYSTOLIC BLOOD PRESSURE: 108 MMHG | OXYGEN SATURATION: 98 % | DIASTOLIC BLOOD PRESSURE: 60 MMHG

## 2025-04-14 DIAGNOSIS — G89.29 CHRONIC LEFT SI JOINT PAIN: ICD-10-CM

## 2025-04-14 DIAGNOSIS — M54.9 BACK PAIN, UNSPECIFIED BACK LOCATION, UNSPECIFIED BACK PAIN LATERALITY, UNSPECIFIED CHRONICITY: ICD-10-CM

## 2025-04-14 DIAGNOSIS — M53.3 CHRONIC LEFT SI JOINT PAIN: ICD-10-CM

## 2025-04-14 DIAGNOSIS — M25.552 LEFT HIP PAIN: Primary | ICD-10-CM

## 2025-04-14 PROCEDURE — 1123F ACP DISCUSS/DSCN MKR DOCD: CPT | Performed by: ANESTHESIOLOGY

## 2025-04-14 PROCEDURE — 99204 OFFICE O/P NEW MOD 45 MIN: CPT | Performed by: ANESTHESIOLOGY

## 2025-04-14 PROCEDURE — 1160F RVW MEDS BY RX/DR IN RCRD: CPT | Performed by: ANESTHESIOLOGY

## 2025-04-14 PROCEDURE — 73502 X-RAY EXAM HIP UNI 2-3 VIEWS: CPT | Mod: LT

## 2025-04-14 PROCEDURE — 3074F SYST BP LT 130 MM HG: CPT | Performed by: ANESTHESIOLOGY

## 2025-04-14 PROCEDURE — 3078F DIAST BP <80 MM HG: CPT | Performed by: ANESTHESIOLOGY

## 2025-04-14 PROCEDURE — 72100 X-RAY EXAM L-S SPINE 2/3 VWS: CPT

## 2025-04-14 PROCEDURE — 1159F MED LIST DOCD IN RCRD: CPT | Performed by: ANESTHESIOLOGY

## 2025-04-14 PROCEDURE — 99214 OFFICE O/P EST MOD 30 MIN: CPT | Performed by: ANESTHESIOLOGY

## 2025-04-14 PROCEDURE — G2211 COMPLEX E/M VISIT ADD ON: HCPCS | Performed by: ANESTHESIOLOGY

## 2025-04-14 PROCEDURE — 1036F TOBACCO NON-USER: CPT | Performed by: ANESTHESIOLOGY

## 2025-04-14 PROCEDURE — 1125F AMNT PAIN NOTED PAIN PRSNT: CPT | Performed by: ANESTHESIOLOGY

## 2025-04-14 PROCEDURE — 72100 X-RAY EXAM L-S SPINE 2/3 VWS: CPT | Performed by: RADIOLOGY

## 2025-04-14 ASSESSMENT — ENCOUNTER SYMPTOMS
PSYCHIATRIC NEGATIVE: 1
ENDOCRINE NEGATIVE: 1
OCCASIONAL FEELINGS OF UNSTEADINESS: 0
EYES NEGATIVE: 1
LOSS OF SENSATION IN FEET: 0
HEMATOLOGIC/LYMPHATIC NEGATIVE: 1
NEUROLOGICAL NEGATIVE: 1
DEPRESSION: 0
CONSTITUTIONAL NEGATIVE: 1
RESPIRATORY NEGATIVE: 1
GASTROINTESTINAL NEGATIVE: 1
ARTHRALGIAS: 1
CARDIOVASCULAR NEGATIVE: 1

## 2025-04-14 ASSESSMENT — PATIENT HEALTH QUESTIONNAIRE - PHQ9
SUM OF ALL RESPONSES TO PHQ9 QUESTIONS 1 AND 2: 0
2. FEELING DOWN, DEPRESSED OR HOPELESS: NOT AT ALL
1. LITTLE INTEREST OR PLEASURE IN DOING THINGS: NOT AT ALL

## 2025-04-14 ASSESSMENT — PAIN DESCRIPTION - DESCRIPTORS: DESCRIPTORS: SHARP

## 2025-04-14 ASSESSMENT — LIFESTYLE VARIABLES: TOTAL SCORE: 0

## 2025-04-14 ASSESSMENT — PAIN SCALES - GENERAL
PAINLEVEL_OUTOF10: 3
PAINLEVEL_OUTOF10: 3

## 2025-04-14 ASSESSMENT — PAIN - FUNCTIONAL ASSESSMENT: PAIN_FUNCTIONAL_ASSESSMENT: 0-10

## 2025-04-14 NOTE — PROGRESS NOTES
PAIN MANAGEMENT NEW PATIENT OFFICE NOTE    Date of Service: 4/14/2025    SUBJECTIVE    CHIEF COMPLAINT: L hip pain    HISTORY OF PRESENT ILLNESS    Kate Bearden is a 79 y.o. female with PMH HTN, OA who presents as new patient referred by Lokesh Macias PA-C with L hip pain.    Pt describes L hip pain since 2023 when she was dx'd with osteopenia. Pain radiates to L groin occasionally . Pain is worse with standing/walking. Pain is alleviated with sitting down. Returns at night.  Pt has tried Tylenol, tramadol without sustained relief    Pt denies new-onset numbness, weakness, bowel/bladder incontinence.  Pt denies recent infection, allergy to Latex/iodine/contrast. Patient is currently taking the following blood thinner(s): N/A    REVIEW OF SYSTEMS  Review of Systems   Constitutional: Negative.    HENT: Negative.     Eyes: Negative.    Respiratory: Negative.     Cardiovascular: Negative.    Gastrointestinal: Negative.    Endocrine: Negative.    Musculoskeletal:  Positive for arthralgias.   Skin: Negative.    Neurological: Negative.    Hematological: Negative.    Psychiatric/Behavioral: Negative.         PAST MEDICAL HISTORY  History reviewed. No pertinent past medical history.  History reviewed. No pertinent surgical history.  No family history on file.    CURRENT MEDICATIONS  Current Outpatient Medications   Medication Sig Dispense Refill    alendronate (Fosamax) 70 mg tablet TAKE 1 TABLET BY MOUTH 30 MINUTES BEFORE THE 1ST FOOD , BEVERAGE OR MEDICINE OF THE DAY WITH PLAIN WATER as directed 4 tablet 11    amLODIPine (Norvasc) 10 mg tablet Take 1 tablet (10 mg) by mouth once daily. 100 tablet 2    simvastatin (Zocor) 10 mg tablet Take 1 tablet (10 mg) by mouth once daily in the evening. 100 tablet 2    traMADol (Ultram) 50 mg tablet Take 1 tablet (50 mg) by mouth every 12 hours if needed for moderate pain (4 - 6). Do not fill before March 13, 2025. 60 tablet 2     No current facility-administered  medications for this visit.       ALLERGIES AND DRUG REACTIONS  No Known Allergies       OBJECTIVE  Visit Vitals  /60   Pulse 110   SpO2 98%   Smoking Status Never       Last Recorded Pain Score (if available):           Pain Score:   3     Physical Exam  Vitals and nursing note reviewed.     General: Sitting in chair, NAD  Head: NCAT  Eyes: Sclera/conjunctiva clear, EOMI, PERRL  Nose/mouth: MMM  CV: Good distal pulses  Lungs: Good/equal chest excursion  Abdomen: Soft, ND  Ext: No cyanosis/edema  MSK: L-spine alignment: unremarkable, L paraspinal m TTP, L PSIS TTP, L-spine ROM: extension lmtd by pain  L hip: neg log roll. No leg-length discrepancy.  +pain on internal rotation      Neuro: AAOx3, CN grossly nl   \  Psych: affect nl  Skin: no rash/lesions      REVIEW OF LABORATORY DATA  I have reviewed the following lab results:  WBC   Date Value Ref Range Status   07/02/2019 7.7 4.5 - 11.0 K/UL Final     RBC   Date Value Ref Range Status   07/02/2019 3.80 (L) 4.0 - 4.9 M/UL Final     Hemoglobin   Date Value Ref Range Status   07/02/2019 11.8 (L) 12.0 - 15.0 GM/DL Final     Hematocrit   Date Value Ref Range Status   07/02/2019 37.4 36 - 44 % Final     MCV   Date Value Ref Range Status   07/02/2019 98.4 80 - 100 FL Final     MCH   Date Value Ref Range Status   07/02/2019 31.1 26 - 34 PG Final     MCHC   Date Value Ref Range Status   07/02/2019 31.6 31 - 37 % Final     Platelets   Date Value Ref Range Status   07/02/2019 237 150 - 450 K/UL Final     MPV   Date Value Ref Range Status   07/02/2019 9.6 7.0 - 12.6 CU Final     Sodium   Date Value Ref Range Status   08/13/2024 143 136 - 145 mmol/L Final     Potassium   Date Value Ref Range Status   08/13/2024 4.2 3.5 - 5.3 mmol/L Final     Bicarbonate   Date Value Ref Range Status   08/13/2024 27 21 - 32 mmol/L Final     Urea Nitrogen   Date Value Ref Range Status   08/13/2024 13 6 - 23 mg/dL Final     Calcium   Date Value Ref Range Status   08/13/2024 9.1 8.6 - 10.3  "mg/dL Final     No results found for: \"PROTIME\", \"PTT\", \"INR\", \"FIBRINOGEN\"      REVIEW OF RADIOLOGY   I have reviewed the following:  Radiology Studies           N/A       ASSESSMENT & PLAN  Kate Bearden is a 79 y.o. female with PMHHTN, OA who presents as new patient referred by Lokesh Macias PA-C with L hip pain.    1) L hip pain  -Since 2023 with radiation to L groin and reproducible on exam likely 2/2 OA- consider radiculitis  -Refractive to >1 y conservative tx including Tylenol, tramadol  -XR L hip & L-spine  -Schedule L hip CSI w/ fluoro to target pain generator as seen on PE and minimize risk/likelihood of chronic opioid use and/or surgery  -Discussed utility of PT, which pt declined. HEP prescribed for LB and L hip 2-3 x/w for 6 w            Discussed procedure risks/benefits in detail with patient. Pt meets medical necessity for procedure due to failure of conservative measures. Reviewed procedural risks including bleeding, infection, nerve damage, paralysis. Also reviewed mitigating factors such as screening for infection/blood thinner use, sterile precautions, and image-guidance when applicable. All questions answered. Pt/guardian expressed understanding and choose to proceed    Today's visit involved establishment of care and a complete examination with review of records. In the context of the complexity of this patient's chronic pain diagnosis, long-term expectations and care planning discussed. Imaging studies ordered are placed do elucidate the patient's diagnosis, but also to evaluate the patient's candidacy for procedural and surgical interventions. The risks and benefits of these potential interventions are detailed as above.         Dorothea Chapin MD  Anesthesiologist & Interventional Pain Physician   [facility] [place]  O: [phone]  F: [phone]  10:37 AM  04/14/25    "

## 2025-04-14 NOTE — LETTER
April 14, 2025     Lokesh Macias PA-C  [address]  [place] [address]    Patient: Kate Bearden   YOB: 1945   Date of Visit: 4/14/2025       Dear Dr. Lokesh Macias PA-C:    Thank you for referring Kate Bearden to me for evaluation. Below are my notes for this consultation.  If you have questions, please do not hesitate to call me. I look forward to following your patient along with you.       Sincerely,     Dorothea Chapin MD      CC: No Recipients  ______________________________________________________________________________________    PAIN MANAGEMENT NEW PATIENT OFFICE NOTE    Date of Service: 4/14/2025    SUBJECTIVE    CHIEF COMPLAINT: L hip pain    HISTORY OF PRESENT ILLNESS    Kaet Bearden is a 79 y.o. female with PMH HTN, OA who presents as new patient referred by Lokesh Macias PA-C with L hip pain.    Pt describes L hip pain since 2023 when she was dx'd with osteopenia. Pain radiates to L groin occasionally . Pain is worse with standing/walking. Pain is alleviated with sitting down. Returns at night.  Pt has tried Tylenol, tramadol without sustained relief    Pt denies new-onset numbness, weakness, bowel/bladder incontinence.  Pt denies recent infection, allergy to Latex/iodine/contrast. Patient is currently taking the following blood thinner(s): N/A    REVIEW OF SYSTEMS  Review of Systems   Constitutional: Negative.    HENT: Negative.     Eyes: Negative.    Respiratory: Negative.     Cardiovascular: Negative.    Gastrointestinal: Negative.    Endocrine: Negative.    Musculoskeletal:  Positive for arthralgias.   Skin: Negative.    Neurological: Negative.    Hematological: Negative.    Psychiatric/Behavioral: Negative.         PAST MEDICAL HISTORY  History reviewed. No pertinent past medical history.  History reviewed. No pertinent surgical history.  No family history on file.    CURRENT MEDICATIONS  Current Outpatient Medications   Medication  Sig Dispense Refill   • alendronate (Fosamax) 70 mg tablet TAKE 1 TABLET BY MOUTH 30 MINUTES BEFORE THE 1ST FOOD , BEVERAGE OR MEDICINE OF THE DAY WITH PLAIN WATER as directed 4 tablet 11   • amLODIPine (Norvasc) 10 mg tablet Take 1 tablet (10 mg) by mouth once daily. 100 tablet 2   • simvastatin (Zocor) 10 mg tablet Take 1 tablet (10 mg) by mouth once daily in the evening. 100 tablet 2   • traMADol (Ultram) 50 mg tablet Take 1 tablet (50 mg) by mouth every 12 hours if needed for moderate pain (4 - 6). Do not fill before March 13, 2025. 60 tablet 2     No current facility-administered medications for this visit.       ALLERGIES AND DRUG REACTIONS  No Known Allergies       OBJECTIVE  Visit Vitals  /60   Pulse 110   SpO2 98%   Smoking Status Never       Last Recorded Pain Score (if available):           Pain Score:   3     Physical Exam  Vitals and nursing note reviewed.     General: Sitting in chair, NAD  Head: NCAT  Eyes: Sclera/conjunctiva clear, EOMI, PERRL  Nose/mouth: MMM  CV: Good distal pulses  Lungs: Good/equal chest excursion  Abdomen: Soft, ND  Ext: No cyanosis/edema  MSK: L-spine alignment: unremarkable, L paraspinal m TTP, L PSIS TTP, L-spine ROM: extension lmtd by pain  L hip: neg log roll. No leg-length discrepancy.  +pain on internal rotation      Neuro: AAOx3, CN grossly nl   \  Psych: affect nl  Skin: no rash/lesions      REVIEW OF LABORATORY DATA  I have reviewed the following lab results:  WBC   Date Value Ref Range Status   07/02/2019 7.7 4.5 - 11.0 K/UL Final     RBC   Date Value Ref Range Status   07/02/2019 3.80 (L) 4.0 - 4.9 M/UL Final     Hemoglobin   Date Value Ref Range Status   07/02/2019 11.8 (L) 12.0 - 15.0 GM/DL Final     Hematocrit   Date Value Ref Range Status   07/02/2019 37.4 36 - 44 % Final     MCV   Date Value Ref Range Status   07/02/2019 98.4 80 - 100 FL Final     MCH   Date Value Ref Range Status   07/02/2019 31.1 26 - 34 PG Final     MCHC   Date Value Ref Range Status  "  07/02/2019 31.6 31 - 37 % Final     Platelets   Date Value Ref Range Status   07/02/2019 237 150 - 450 K/UL Final     MPV   Date Value Ref Range Status   07/02/2019 9.6 7.0 - 12.6 CU Final     Sodium   Date Value Ref Range Status   08/13/2024 143 136 - 145 mmol/L Final     Potassium   Date Value Ref Range Status   08/13/2024 4.2 3.5 - 5.3 mmol/L Final     Bicarbonate   Date Value Ref Range Status   08/13/2024 27 21 - 32 mmol/L Final     Urea Nitrogen   Date Value Ref Range Status   08/13/2024 13 6 - 23 mg/dL Final     Calcium   Date Value Ref Range Status   08/13/2024 9.1 8.6 - 10.3 mg/dL Final     No results found for: \"PROTIME\", \"PTT\", \"INR\", \"FIBRINOGEN\"      REVIEW OF RADIOLOGY   I have reviewed the following:  Radiology Studies                  ASSESSMENT & PLAN  Kate Bearden is a 79 y.o. female with PMHHTN, OA who presents as new patient referred by Lokesh Macias PA-C with L hip pain.    1) L hip pain  -Since 2023 with radiation to L groin and reproducible on exam likely 2/2 OA- consider radiculitis  -Refractive to >1 y conservative tx including Tylenol, tramadol  -XR L hip & L-spine  -Schedule L hip CSI w/ fluoro to target pain generator as seen on PE and minimize risk/likelihood of chronic opioid use and/or surgery  -Discussed utility of PT, which pt declined. HEP prescribed for LB and L hip 2-3 x/w for 6 w            Discussed procedure risks/benefits in detail with patient. Pt meets medical necessity for procedure due to failure of conservative measures. Reviewed procedural risks including bleeding, infection, nerve damage, paralysis. Also reviewed mitigating factors such as screening for infection/blood thinner use, sterile precautions, and image-guidance when applicable. All questions answered. Pt/guardian expressed understanding and choose to proceed    Today's visit involved establishment of care and a complete examination with review of records. In the context of the complexity of this " patient's chronic pain diagnosis, long-term expectations and care planning discussed. Imaging studies ordered are placed do elucidate the patient's diagnosis, but also to evaluate the patient's candidacy for procedural and surgical interventions. The risks and benefits of these potential interventions are detailed as above.         Dorothea Chapin MD  Anesthesiologist & Interventional Pain Physician   [facility] [place]  O: [phone]  F: [phone]  10:37 AM  04/14/25

## 2025-04-14 NOTE — H&P (VIEW-ONLY)
PAIN MANAGEMENT NEW PATIENT OFFICE NOTE    Date of Service: 4/14/2025    SUBJECTIVE    CHIEF COMPLAINT: L hip pain    HISTORY OF PRESENT ILLNESS    Kate Bearden is a 79 y.o. female with PMH HTN, OA who presents as new patient referred by Lokesh Macias PA-C with L hip pain.    Pt describes L hip pain since 2023 when she was dx'd with osteopenia. Pain radiates to L groin occasionally . Pain is worse with standing/walking. Pain is alleviated with sitting down. Returns at night.  Pt has tried Tylenol, tramadol without sustained relief    Pt denies new-onset numbness, weakness, bowel/bladder incontinence.  Pt denies recent infection, allergy to Latex/iodine/contrast. Patient is currently taking the following blood thinner(s): N/A    REVIEW OF SYSTEMS  Review of Systems   Constitutional: Negative.    HENT: Negative.     Eyes: Negative.    Respiratory: Negative.     Cardiovascular: Negative.    Gastrointestinal: Negative.    Endocrine: Negative.    Musculoskeletal:  Positive for arthralgias.   Skin: Negative.    Neurological: Negative.    Hematological: Negative.    Psychiatric/Behavioral: Negative.         PAST MEDICAL HISTORY  History reviewed. No pertinent past medical history.  History reviewed. No pertinent surgical history.  No family history on file.    CURRENT MEDICATIONS  Current Outpatient Medications   Medication Sig Dispense Refill    alendronate (Fosamax) 70 mg tablet TAKE 1 TABLET BY MOUTH 30 MINUTES BEFORE THE 1ST FOOD , BEVERAGE OR MEDICINE OF THE DAY WITH PLAIN WATER as directed 4 tablet 11    amLODIPine (Norvasc) 10 mg tablet Take 1 tablet (10 mg) by mouth once daily. 100 tablet 2    simvastatin (Zocor) 10 mg tablet Take 1 tablet (10 mg) by mouth once daily in the evening. 100 tablet 2    traMADol (Ultram) 50 mg tablet Take 1 tablet (50 mg) by mouth every 12 hours if needed for moderate pain (4 - 6). Do not fill before March 13, 2025. 60 tablet 2     No current facility-administered  medications for this visit.       ALLERGIES AND DRUG REACTIONS  No Known Allergies       OBJECTIVE  Visit Vitals  /60   Pulse 110   SpO2 98%   Smoking Status Never       Last Recorded Pain Score (if available):           Pain Score:   3     Physical Exam  Vitals and nursing note reviewed.     General: Sitting in chair, NAD  Head: NCAT  Eyes: Sclera/conjunctiva clear, EOMI, PERRL  Nose/mouth: MMM  CV: Good distal pulses  Lungs: Good/equal chest excursion  Abdomen: Soft, ND  Ext: No cyanosis/edema  MSK: L-spine alignment: unremarkable, L paraspinal m TTP, L PSIS TTP, L-spine ROM: extension lmtd by pain  L hip: neg log roll. No leg-length discrepancy.  +pain on internal rotation      Neuro: AAOx3, CN grossly nl   \  Psych: affect nl  Skin: no rash/lesions      REVIEW OF LABORATORY DATA  I have reviewed the following lab results:  WBC   Date Value Ref Range Status   07/02/2019 7.7 4.5 - 11.0 K/UL Final     RBC   Date Value Ref Range Status   07/02/2019 3.80 (L) 4.0 - 4.9 M/UL Final     Hemoglobin   Date Value Ref Range Status   07/02/2019 11.8 (L) 12.0 - 15.0 GM/DL Final     Hematocrit   Date Value Ref Range Status   07/02/2019 37.4 36 - 44 % Final     MCV   Date Value Ref Range Status   07/02/2019 98.4 80 - 100 FL Final     MCH   Date Value Ref Range Status   07/02/2019 31.1 26 - 34 PG Final     MCHC   Date Value Ref Range Status   07/02/2019 31.6 31 - 37 % Final     Platelets   Date Value Ref Range Status   07/02/2019 237 150 - 450 K/UL Final     MPV   Date Value Ref Range Status   07/02/2019 9.6 7.0 - 12.6 CU Final     Sodium   Date Value Ref Range Status   08/13/2024 143 136 - 145 mmol/L Final     Potassium   Date Value Ref Range Status   08/13/2024 4.2 3.5 - 5.3 mmol/L Final     Bicarbonate   Date Value Ref Range Status   08/13/2024 27 21 - 32 mmol/L Final     Urea Nitrogen   Date Value Ref Range Status   08/13/2024 13 6 - 23 mg/dL Final     Calcium   Date Value Ref Range Status   08/13/2024 9.1 8.6 - 10.3  "mg/dL Final     No results found for: \"PROTIME\", \"PTT\", \"INR\", \"FIBRINOGEN\"      REVIEW OF RADIOLOGY   I have reviewed the following:  Radiology Studies           N/A       ASSESSMENT & PLAN  Kate Bearden is a 79 y.o. female with PMHHTN, OA who presents as new patient referred by Lokesh Macias PA-C with L hip pain.    1) L hip pain  -Since 2023 with radiation to L groin and reproducible on exam likely 2/2 OA- consider radiculitis  -Refractive to >1 y conservative tx including Tylenol, tramadol  -XR L hip & L-spine  -Schedule L hip CSI w/ fluoro to target pain generator as seen on PE and minimize risk/likelihood of chronic opioid use and/or surgery  -Discussed utility of PT, which pt declined. HEP prescribed for LB and L hip 2-3 x/w for 6 w            Discussed procedure risks/benefits in detail with patient. Pt meets medical necessity for procedure due to failure of conservative measures. Reviewed procedural risks including bleeding, infection, nerve damage, paralysis. Also reviewed mitigating factors such as screening for infection/blood thinner use, sterile precautions, and image-guidance when applicable. All questions answered. Pt/guardian expressed understanding and choose to proceed    Today's visit involved establishment of care and a complete examination with review of records. In the context of the complexity of this patient's chronic pain diagnosis, long-term expectations and care planning discussed. Imaging studies ordered are placed do elucidate the patient's diagnosis, but also to evaluate the patient's candidacy for procedural and surgical interventions. The risks and benefits of these potential interventions are detailed as above.         Dorothea Chapin MD  Anesthesiologist & Interventional Pain Physician   [facility] [place]  O: [phone]  F: [phone]  10:37 AM  04/14/25    "

## 2025-05-01 ENCOUNTER — APPOINTMENT (OUTPATIENT)
Dept: CARDIOLOGY | Facility: HOSPITAL | Age: 80
End: 2025-05-01
Payer: MEDICARE

## 2025-05-01 ENCOUNTER — HOSPITAL ENCOUNTER (OUTPATIENT)
Dept: GASTROENTEROLOGY | Facility: HOSPITAL | Age: 80
Discharge: HOME | End: 2025-05-01
Payer: MEDICARE

## 2025-05-01 ENCOUNTER — APPOINTMENT (OUTPATIENT)
Dept: RADIOLOGY | Facility: HOSPITAL | Age: 80
End: 2025-05-01
Payer: MEDICARE

## 2025-05-01 ENCOUNTER — HOSPITAL ENCOUNTER (EMERGENCY)
Facility: HOSPITAL | Age: 80
Discharge: HOME | End: 2025-05-01
Attending: EMERGENCY MEDICINE
Payer: MEDICARE

## 2025-05-01 VITALS
WEIGHT: 166 LBS | OXYGEN SATURATION: 96 % | TEMPERATURE: 97.7 F | HEART RATE: 112 BPM | DIASTOLIC BLOOD PRESSURE: 96 MMHG | HEIGHT: 63 IN | RESPIRATION RATE: 16 BRPM | SYSTOLIC BLOOD PRESSURE: 139 MMHG | BODY MASS INDEX: 29.41 KG/M2

## 2025-05-01 VITALS
OXYGEN SATURATION: 96 % | RESPIRATION RATE: 17 BRPM | BODY MASS INDEX: 27.66 KG/M2 | HEART RATE: 92 BPM | DIASTOLIC BLOOD PRESSURE: 95 MMHG | SYSTOLIC BLOOD PRESSURE: 144 MMHG | WEIGHT: 166.01 LBS | HEIGHT: 65 IN | TEMPERATURE: 97.7 F

## 2025-05-01 DIAGNOSIS — I48.91 ATRIAL FIBRILLATION, UNSPECIFIED TYPE (MULTI): Primary | ICD-10-CM

## 2025-05-01 DIAGNOSIS — M25.552 LEFT HIP PAIN: ICD-10-CM

## 2025-05-01 DIAGNOSIS — I10 BENIGN ESSENTIAL HYPERTENSION: ICD-10-CM

## 2025-05-01 DIAGNOSIS — I10 HYPERTENSION, UNSPECIFIED TYPE: ICD-10-CM

## 2025-05-01 LAB
ALBUMIN SERPL BCP-MCNC: 4.2 G/DL (ref 3.4–5)
ALP SERPL-CCNC: 52 U/L (ref 33–136)
ALT SERPL W P-5'-P-CCNC: 10 U/L (ref 7–45)
ANION GAP SERPL CALCULATED.3IONS-SCNC: 15 MMOL/L (ref 10–20)
APPEARANCE UR: CLEAR
AST SERPL W P-5'-P-CCNC: 18 U/L (ref 9–39)
BASOPHILS # BLD AUTO: 0.03 X10*3/UL (ref 0–0.1)
BASOPHILS NFR BLD AUTO: 0.5 %
BILIRUB SERPL-MCNC: 0.7 MG/DL (ref 0–1.2)
BILIRUB UR STRIP.AUTO-MCNC: NEGATIVE MG/DL
BNP SERPL-MCNC: 203 PG/ML (ref 0–99)
BUN SERPL-MCNC: 25 MG/DL (ref 6–23)
CALCIUM SERPL-MCNC: 9.1 MG/DL (ref 8.6–10.3)
CARDIAC TROPONIN I PNL SERPL HS: 3 NG/L (ref 0–13)
CARDIAC TROPONIN I PNL SERPL HS: 3 NG/L (ref 0–13)
CHLORIDE SERPL-SCNC: 108 MMOL/L (ref 98–107)
CO2 SERPL-SCNC: 21 MMOL/L (ref 21–32)
COLOR UR: ABNORMAL
CREAT SERPL-MCNC: 0.9 MG/DL (ref 0.5–1.05)
EGFRCR SERPLBLD CKD-EPI 2021: 65 ML/MIN/1.73M*2
EOSINOPHIL # BLD AUTO: 0.09 X10*3/UL (ref 0–0.4)
EOSINOPHIL NFR BLD AUTO: 1.6 %
ERYTHROCYTE [DISTWIDTH] IN BLOOD BY AUTOMATED COUNT: 13.2 % (ref 11.5–14.5)
GLUCOSE SERPL-MCNC: 114 MG/DL (ref 74–99)
GLUCOSE UR STRIP.AUTO-MCNC: NORMAL MG/DL
HCT VFR BLD AUTO: 45 % (ref 36–46)
HGB BLD-MCNC: 14.5 G/DL (ref 12–16)
IMM GRANULOCYTES # BLD AUTO: 0.02 X10*3/UL (ref 0–0.5)
IMM GRANULOCYTES NFR BLD AUTO: 0.4 % (ref 0–0.9)
KETONES UR STRIP.AUTO-MCNC: NEGATIVE MG/DL
LEUKOCYTE ESTERASE UR QL STRIP.AUTO: ABNORMAL
LYMPHOCYTES # BLD AUTO: 0.97 X10*3/UL (ref 0.8–3)
LYMPHOCYTES NFR BLD AUTO: 17.6 %
MAGNESIUM SERPL-MCNC: 2.05 MG/DL (ref 1.6–2.4)
MCH RBC QN AUTO: 32.7 PG (ref 26–34)
MCHC RBC AUTO-ENTMCNC: 32.2 G/DL (ref 32–36)
MCV RBC AUTO: 101 FL (ref 80–100)
MONOCYTES # BLD AUTO: 0.42 X10*3/UL (ref 0.05–0.8)
MONOCYTES NFR BLD AUTO: 7.6 %
NEUTROPHILS # BLD AUTO: 3.97 X10*3/UL (ref 1.6–5.5)
NEUTROPHILS NFR BLD AUTO: 72.3 %
NITRITE UR QL STRIP.AUTO: NEGATIVE
NRBC BLD-RTO: 0 /100 WBCS (ref 0–0)
PH UR STRIP.AUTO: 5.5 [PH]
PLATELET # BLD AUTO: 272 X10*3/UL (ref 150–450)
POTASSIUM SERPL-SCNC: 3.9 MMOL/L (ref 3.5–5.3)
PROT SERPL-MCNC: 7.4 G/DL (ref 6.4–8.2)
PROT UR STRIP.AUTO-MCNC: NEGATIVE MG/DL
Q ONSET: 222 MS
QRS COUNT: 19 BEATS
QRS DURATION: 132 MS
QT INTERVAL: 360 MS
QTC CALCULATION(BAZETT): 502 MS
QTC FREDERICIA: 450 MS
R AXIS: 51 DEGREES
RBC # BLD AUTO: 4.44 X10*6/UL (ref 4–5.2)
RBC # UR STRIP.AUTO: NEGATIVE MG/DL
RBC #/AREA URNS AUTO: NORMAL /HPF
SODIUM SERPL-SCNC: 140 MMOL/L (ref 136–145)
SP GR UR STRIP.AUTO: >1.05
T AXIS: -16 DEGREES
T OFFSET: 402 MS
TSH SERPL-ACNC: 0.91 MIU/L (ref 0.44–3.98)
UROBILINOGEN UR STRIP.AUTO-MCNC: NORMAL MG/DL
VENTRICULAR RATE: 117 BPM
WBC # BLD AUTO: 5.5 X10*3/UL (ref 4.4–11.3)
WBC #/AREA URNS AUTO: NORMAL /HPF

## 2025-05-01 PROCEDURE — 85025 COMPLETE CBC W/AUTO DIFF WBC: CPT

## 2025-05-01 PROCEDURE — 71275 CT ANGIOGRAPHY CHEST: CPT | Mod: FOREIGN READ | Performed by: RADIOLOGY

## 2025-05-01 PROCEDURE — 2500000001 HC RX 250 WO HCPCS SELF ADMINISTERED DRUGS (ALT 637 FOR MEDICARE OP)

## 2025-05-01 PROCEDURE — 2500000005 HC RX 250 GENERAL PHARMACY W/O HCPCS

## 2025-05-01 PROCEDURE — 84484 ASSAY OF TROPONIN QUANT: CPT

## 2025-05-01 PROCEDURE — 20610 DRAIN/INJ JOINT/BURSA W/O US: CPT | Performed by: ANESTHESIOLOGY

## 2025-05-01 PROCEDURE — 93010 ELECTROCARDIOGRAM REPORT: CPT | Performed by: INTERNAL MEDICINE

## 2025-05-01 PROCEDURE — 77002 NEEDLE LOCALIZATION BY XRAY: CPT | Performed by: ANESTHESIOLOGY

## 2025-05-01 PROCEDURE — 2550000001 HC RX 255 CONTRASTS: Performed by: EMERGENCY MEDICINE

## 2025-05-01 PROCEDURE — 2550000001 HC RX 255 CONTRASTS: Performed by: ANESTHESIOLOGY

## 2025-05-01 PROCEDURE — 87086 URINE CULTURE/COLONY COUNT: CPT | Mod: TRILAB

## 2025-05-01 PROCEDURE — 36415 COLL VENOUS BLD VENIPUNCTURE: CPT

## 2025-05-01 PROCEDURE — 71275 CT ANGIOGRAPHY CHEST: CPT

## 2025-05-01 PROCEDURE — 96374 THER/PROPH/DIAG INJ IV PUSH: CPT | Mod: 59

## 2025-05-01 PROCEDURE — 83735 ASSAY OF MAGNESIUM: CPT

## 2025-05-01 PROCEDURE — 83880 ASSAY OF NATRIURETIC PEPTIDE: CPT

## 2025-05-01 PROCEDURE — 93005 ELECTROCARDIOGRAM TRACING: CPT

## 2025-05-01 PROCEDURE — 84443 ASSAY THYROID STIM HORMONE: CPT

## 2025-05-01 PROCEDURE — 81001 URINALYSIS AUTO W/SCOPE: CPT

## 2025-05-01 PROCEDURE — 2500000004 HC RX 250 GENERAL PHARMACY W/ HCPCS (ALT 636 FOR OP/ED): Performed by: ANESTHESIOLOGY

## 2025-05-01 PROCEDURE — 84075 ASSAY ALKALINE PHOSPHATASE: CPT

## 2025-05-01 PROCEDURE — 99285 EMERGENCY DEPT VISIT HI MDM: CPT | Mod: 25 | Performed by: EMERGENCY MEDICINE

## 2025-05-01 RX ORDER — DILTIAZEM HYDROCHLORIDE 5 MG/ML
10 INJECTION INTRAVENOUS ONCE
Status: COMPLETED | OUTPATIENT
Start: 2025-05-01 | End: 2025-05-01

## 2025-05-01 RX ORDER — METHYLPREDNISOLONE ACETATE 40 MG/ML
INJECTION, SUSPENSION INTRA-ARTICULAR; INTRALESIONAL; INTRAMUSCULAR; SOFT TISSUE AS NEEDED
Status: COMPLETED | OUTPATIENT
Start: 2025-05-01 | End: 2025-05-01

## 2025-05-01 RX ORDER — METOPROLOL SUCCINATE 25 MG/1
25 TABLET, EXTENDED RELEASE ORAL ONCE
Status: COMPLETED | OUTPATIENT
Start: 2025-05-01 | End: 2025-05-01

## 2025-05-01 RX ORDER — BUPIVACAINE HYDROCHLORIDE 5 MG/ML
INJECTION, SOLUTION PERINEURAL AS NEEDED
Status: COMPLETED | OUTPATIENT
Start: 2025-05-01 | End: 2025-05-01

## 2025-05-01 RX ORDER — AMLODIPINE BESYLATE 10 MG/1
5 TABLET ORAL DAILY
Qty: 100 TABLET | Refills: 0 | Status: SHIPPED | OUTPATIENT
Start: 2025-05-01

## 2025-05-01 RX ORDER — LIDOCAINE HYDROCHLORIDE 10 MG/ML
INJECTION, SOLUTION EPIDURAL; INFILTRATION; INTRACAUDAL; PERINEURAL AS NEEDED
Status: COMPLETED | OUTPATIENT
Start: 2025-05-01 | End: 2025-05-01

## 2025-05-01 RX ORDER — METOPROLOL SUCCINATE 25 MG/1
25 TABLET, EXTENDED RELEASE ORAL DAILY
Qty: 20 TABLET | Refills: 0 | Status: SHIPPED | OUTPATIENT
Start: 2025-05-01 | End: 2025-05-21

## 2025-05-01 RX ADMIN — LIDOCAINE HYDROCHLORIDE 3 ML: 10 INJECTION, SOLUTION EPIDURAL; INFILTRATION; INTRACAUDAL; PERINEURAL at 11:38

## 2025-05-01 RX ADMIN — BUPIVACAINE HYDROCHLORIDE 4 ML: 5 INJECTION, SOLUTION PERINEURAL at 11:38

## 2025-05-01 RX ADMIN — IOHEXOL 3 ML: 240 INJECTION, SOLUTION INTRATHECAL; INTRAVASCULAR; INTRAVENOUS; ORAL at 11:38

## 2025-05-01 RX ADMIN — METOPROLOL SUCCINATE 25 MG: 25 TABLET, EXTENDED RELEASE ORAL at 14:32

## 2025-05-01 RX ADMIN — IOHEXOL 75 ML: 350 INJECTION, SOLUTION INTRAVENOUS at 13:34

## 2025-05-01 RX ADMIN — METHYLPREDNISOLONE ACETATE 40 MG: 40 INJECTION, SUSPENSION INTRA-ARTICULAR; INTRALESIONAL; INTRAMUSCULAR; SOFT TISSUE at 11:38

## 2025-05-01 RX ADMIN — DILTIAZEM HYDROCHLORIDE 10 MG: 5 INJECTION INTRAVENOUS at 13:04

## 2025-05-01 ASSESSMENT — COLUMBIA-SUICIDE SEVERITY RATING SCALE - C-SSRS
2. HAVE YOU ACTUALLY HAD ANY THOUGHTS OF KILLING YOURSELF?: NO
2. HAVE YOU ACTUALLY HAD ANY THOUGHTS OF KILLING YOURSELF?: NO
1. IN THE PAST MONTH, HAVE YOU WISHED YOU WERE DEAD OR WISHED YOU COULD GO TO SLEEP AND NOT WAKE UP?: NO
1. IN THE PAST MONTH, HAVE YOU WISHED YOU WERE DEAD OR WISHED YOU COULD GO TO SLEEP AND NOT WAKE UP?: NO
6. HAVE YOU EVER DONE ANYTHING, STARTED TO DO ANYTHING, OR PREPARED TO DO ANYTHING TO END YOUR LIFE?: NO
6. HAVE YOU EVER DONE ANYTHING, STARTED TO DO ANYTHING, OR PREPARED TO DO ANYTHING TO END YOUR LIFE?: NO

## 2025-05-01 ASSESSMENT — PAIN - FUNCTIONAL ASSESSMENT
PAIN_FUNCTIONAL_ASSESSMENT: 0-10

## 2025-05-01 ASSESSMENT — PAIN SCALES - GENERAL
PAINLEVEL_OUTOF10: 6
PAINLEVEL_OUTOF10: 2
PAINLEVEL_OUTOF10: 0 - NO PAIN

## 2025-05-01 NOTE — ED PROCEDURE NOTE
Procedure  Critical Care    Performed by: Jody Mathews MD  Authorized by: Jody Mathews MD    Critical care provider statement:     Critical care time (minutes):  12    Critical care time was exclusive of:  Teaching time and separately billable procedures and treating other patients    Critical care was necessary to treat or prevent imminent or life-threatening deterioration of the following conditions: Rapid A-fib.    Critical care was time spent personally by me on the following activities:  Obtaining history from patient or surrogate, examination of patient, evaluation of patient's response to treatment, discussions with consultants, development of treatment plan with patient or surrogate, blood draw for specimens, ordering and performing treatments and interventions, ordering and review of laboratory studies, ordering and review of radiographic studies, pulse oximetry, re-evaluation of patient's condition and review of old charts  Comments:      Critical care time of  12 minutes billed for management of rapid A-fib, new onset, with initiation of IV diltiazem, monitoring patient is telemetry, consultation with the patient regarding results, consultation with the hospitalist, consultation with the cardiologist.  This time excludes time for billable procedures.      critical care time billed for by me is non concurrent with time billed for by ROSA MARIA Mathews MD  05/01/25 4862

## 2025-05-01 NOTE — INTERVAL H&P NOTE
H&P reviewed. The patient was examined and there are no changes to the H&P.  Kate Bearden is a 79 y.o. female with PMH HTN, OA who presents for L hip CSI w/ fluoro to target pain generator as seen on PE and minimize risk/likelihood of chronic opioid use and/or surgery. Patient's pain stable and persistent from last visit.   Denies allergies to Latex, steroids, local anesthetics, or iodine/contrast. Denies being on blood thinners. Not diabetic.  Denies fever, chills, NS, CP, SOB, cough, N/V.    Discussed procedure risks/benefits in detail with patient. Pt meets medical necessity for procedure due to failure of conservative measures. Reviewed procedural risks including bleeding, infection, nerve damage, paralysis. Also reviewed mitigating factors such as screening for infection/blood thinner use, sterile precautions, and image-guidance when applicable. All questions answered. Pt/guardian expressed understanding and choose to proceed      Dorothea Chapin MD  Anesthesiologist & Interventional Pain Physician   [facility] [place]  O: [phone]  F: [phone]  11:33 AM  05/01/25

## 2025-05-01 NOTE — POST-PROCEDURE NOTE
EKG done per MD order. Afib with RVR noted. Results given to MD. MD at bedside discussing discharge to ER at this time. Patient very understanding. All questions answered at this time.     1207 Taken down in cart with 2 RNS at this time. Report given to ER nurse Faith.

## 2025-05-01 NOTE — ED PROVIDER NOTES
HPI   Chief Complaint   Patient presents with    Palpitations     Patient came from pain management due to a new onset of afib, she got a pain injection in her hip, following the procedure they noticed her heart rate was not in normal sinus. The patient denies chest pain. She states she has a hx of a murmur and takes an aspirin daily for this. Denies a hx of afib. She denies any complaints at this time.        HPI  79-year-old female with history of hypertension referred to ER by her pain management specialist for evaluation of an abnormal heart rhythm.  Patient was getting injections into her left hip that she receives for pain management for her arthritis and her pain management doctor did notice that her heart rate was elevated and irregular.  He did find her to be in A-fib RVR and sent her to ER for further evaluation.  The patient has no acute complaints or symptoms at this time.  She denies chest pain, shortness of breath, true palpitations or diaphoresis.  She states she has been feeling well.  She states that she does have history of a heart murmur and at times will have intermittent palpitations or feelings of chest discomfort but will take an aspirin and this typically resolves her symptoms.  She has never had history of A-fib in the past or abnormal heart rhythm.  No history of CAD or ACS.  No recent leg swelling, surgery or travel.  She otherwise has no acute complaints.      Patient History   Medical History[1]  Surgical History[2]  Family History[3]  Social History[4]    Physical Exam   ED Triage Vitals [05/01/25 1215]   Temperature Heart Rate Respirations BP   36.5 °C (97.7 °F) (!) 118 16 (!) 153/103      Pulse Ox Temp Source Heart Rate Source Patient Position   95 % Oral Monitor Sitting      BP Location FiO2 (%)     Right arm --       Physical Exam  Vitals and nursing note reviewed.   Constitutional:       General: She is not in acute distress.     Appearance: She is well-developed.      Comments:  Well-appearing in no apparent distress resting in exam bed   HENT:      Head: Normocephalic and atraumatic.   Eyes:      Conjunctiva/sclera: Conjunctivae normal.   Cardiovascular:      Rate and Rhythm: Tachycardia present. Rhythm irregular.      Heart sounds: No murmur heard.  Pulmonary:      Effort: Pulmonary effort is normal. No respiratory distress.      Breath sounds: Normal breath sounds.   Abdominal:      Palpations: Abdomen is soft.      Tenderness: There is no abdominal tenderness.   Musculoskeletal:         General: No swelling.      Cervical back: Neck supple.   Skin:     General: Skin is warm and dry.      Capillary Refill: Capillary refill takes less than 2 seconds.   Neurological:      Mental Status: She is alert.   Psychiatric:         Mood and Affect: Mood normal.           ED Course & MDM   Diagnoses as of 05/01/25 1547   Atrial fibrillation, unspecified type (Multi)   Hypertension, unspecified type                 No data recorded     [place] Coma Scale Score: 15 (05/01/25 1217 : Faith Brock RN)                           Medical Decision Making  Parts of this chart have been completed using voice recognition software. Please excuse any errors of transcription.  My thought process and reason for plan has been formulated from the time that I saw the patient until the time of disposition and is not specific to one specific moment during their visit and furthermore my MDM encompasses this entire chart and not only this text box.      HPI: Detailed above.    Exam: A medically appropriate exam performed, outlined above, given the known history and presentation.    History obtained from: pt    EKG: Interpreted by attending physician and reviewed by me    Social Determinants of Health considered during this visit: Lives independently    Medications given during visit:  Medications   dilTIAZem (Cardizem) injection 10 mg (10 mg intravenous Given 5/1/25 1304)   iohexol (OMNIPaque) 350 mg iodine/mL solution  75 mL (75 mL intravenous Given 5/1/25 1334)   metoprolol succinate XL (Toprol-XL) 24 hr tablet 25 mg (25 mg oral Given 5/1/25 1432)        Diagnostic/tests  Labs Reviewed   CBC WITH AUTO DIFFERENTIAL - Abnormal       Result Value    WBC 5.5      nRBC 0.0      RBC 4.44      Hemoglobin 14.5      Hematocrit 45.0       (*)     MCH 32.7      MCHC 32.2      RDW 13.2      Platelets 272      Neutrophils % 72.3      Immature Granulocytes %, Automated 0.4      Lymphocytes % 17.6      Monocytes % 7.6      Eosinophils % 1.6      Basophils % 0.5      Neutrophils Absolute 3.97      Immature Granulocytes Absolute, Automated 0.02      Lymphocytes Absolute 0.97      Monocytes Absolute 0.42      Eosinophils Absolute 0.09      Basophils Absolute 0.03     COMPREHENSIVE METABOLIC PANEL - Abnormal    Glucose 114 (*)     Sodium 140      Potassium 3.9      Chloride 108 (*)     Bicarbonate 21      Anion Gap 15      Urea Nitrogen 25 (*)     Creatinine 0.90      eGFR 65      Calcium 9.1      Albumin 4.2      Alkaline Phosphatase 52      Total Protein 7.4      AST 18      Bilirubin, Total 0.7      ALT 10     B-TYPE NATRIURETIC PEPTIDE - Abnormal     (*)     Narrative:        <100 pg/mL - Heart failure unlikely  100-299 pg/mL - Intermediate probability of acute heart                  failure exacerbation. Correlate with clinical                  context and patient history.    >=300 pg/mL - Heart Failure likely. Correlate with clinical                  context and patient history.    BNP testing is performed using different testing methodology at [facility] than at other [facility]. Direct result comparisons should only be made within the same method.      MAGNESIUM - Normal    Magnesium 2.05     TSH WITH REFLEX TO FREE T4 IF ABNORMAL - Normal    Thyroid Stimulating Hormone 0.91      Narrative:     TSH testing is performed using different testing methodology at [facility] than at other system  [facility]. Direct result comparisons should only be made within the same method.     SERIAL TROPONIN-INITIAL - Normal    Troponin I, High Sensitivity 3      Narrative:     Less than 99th percentile of normal range cutoff-  Female and children under 18 years old <14 ng/L; Male <21 ng/L: Negative  Repeat testing should be performed if clinically indicated.     Female and children under 18 years old 14-50 ng/L; Male 21-50 ng/L:  Consistent with possible cardiac damage and possible increased clinical   risk. Serial measurements may help to assess extent of myocardial damage.     >50 ng/L: Consistent with cardiac damage, increased clinical risk and  myocardial infarction. Serial measurements may help assess extent of   myocardial damage.      NOTE: Children less than 1 year old may have higher baseline troponin   levels and results should be interpreted in conjunction with the overall   clinical context.     NOTE: Troponin I testing is performed using a different   testing methodology at [facility] than at other   [facility]. Direct result comparisons should only   be made within the same method.   SERIAL TROPONIN, 1 HOUR - Normal    Troponin I, High Sensitivity 3      Narrative:     Less than 99th percentile of normal range cutoff-  Female and children under 18 years old <14 ng/L; Male <21 ng/L: Negative  Repeat testing should be performed if clinically indicated.     Female and children under 18 years old 14-50 ng/L; Male 21-50 ng/L:  Consistent with possible cardiac damage and possible increased clinical   risk. Serial measurements may help to assess extent of myocardial damage.     >50 ng/L: Consistent with cardiac damage, increased clinical risk and  myocardial infarction. Serial measurements may help assess extent of   myocardial damage.      NOTE: Children less than 1 year old may have higher baseline troponin   levels and results should be interpreted in conjunction with the overall   clinical  context.     NOTE: Troponin I testing is performed using a different   testing methodology at [facility] than at other   [facility]. Direct result comparisons should only   be made within the same method.   TROPONIN SERIES- (INITIAL, 1 HR)    Narrative:     The following orders were created for panel order Troponin I Series, High Sensitivity (0, 1 HR).  Procedure                               Abnormality         Status                     ---------                               -----------         ------                     Troponin I, High Sensiti...[[id number]]  Normal              Final result               Troponin, High Sensitivi...[[id number]]  Normal              Final result                 Please view results for these tests on the individual orders.   URINALYSIS WITH REFLEX CULTURE AND MICROSCOPIC    Narrative:     The following orders were created for panel order Urinalysis with Reflex Culture and Microscopic.  Procedure                               Abnormality         Status                     ---------                               -----------         ------                     Urinalysis with Reflex C...[[id number]]                      In process                 Extra Urine Gray Tube[[id number]]                                                         Please view results for these tests on the individual orders.   URINALYSIS WITH REFLEX CULTURE AND MICROSCOPIC   EXTRA URINE GRAY TUBE      CT angio chest for pulmonary embolism   Final Result   1.No pulmonary embolism or other acute intrathoracic process.   2.Cardiomegaly with severe coronary artery calcifications.   3.Age-indeterminate although possibly acute superior endplate   compression fracture of T12 with approximately 25% loss of height.   Signed by Jamie Alamo MD           Considerations/further MDM:  Patient is a 79-year-old female presenting for evaluation of abnormal heart rate and rhythm    I saw this patient in  conjunction with Dr. Mathews    Patient awake alert in no apparent distress and without acute symptoms upon arrival to the ER.  Her vital signs are consistent with hypertension and tachycardia otherwise unremarkable.  Heart sounds are rapid and irregular on exam.  Patient without significant pretibial edema on exam.  EKG performed with evidence of A-fib RVR otherwise no evidence of acute ischemia.  Patient provided IV injection of Cardizem and metoprolol 24-hour 25 mg tablet for therapy during the visit.  Her heart rate did improve with therapy but she remained in atrial fibrillation.  Laboratory workup otherwise unremarkable cardiac enzymes negative upon repeat.  No evidence of hyperthyroidism.  No significant electrolyte disturbance.  No evidence of infectious process on workup no evidence of sepsis or toxicity.  CT angio for PE pursued without evidence of PE but does reveal cardiomegaly with severe coronary artery calcifications.  Care discussed with hospitalist on-call who would like cardiology recommendations.  Care discussed with cardiologist on-call Dr. Saldivar who recommends that her amlodipine be cut in half and she be placed on metoprolol and Eliquis and can follow-up outpatient for further evaluation of her abnormal heart rhythm.  Patient is agreeable with this plan of care.  She is released in good condition.  I discussed the laboratory and imaging findings with the patient at bedside. Patient's questions and concerns were addressed. Patient was released in good condition, discharged with instructions to follow up with primary care provider and appropriate specialist, and to return to ED at any time for worsening symptoms or any other concerns. Patient demonstrates understanding of the findings and the importance of appropriate follow up care.         Procedure  Procedures       [1] History reviewed. No pertinent past medical history.  [2]   Past Surgical History:  Procedure Laterality Date    TOTAL KNEE  ARTHROPLASTY Right 07/01/2019   [3] No family history on file.  [4]   Social History  Tobacco Use    Smoking status: Never    Smokeless tobacco: Never   Substance Use Topics    Alcohol use: Never    Drug use: Never        Katherine Galarza PA-C  05/01/25 1547

## 2025-05-01 NOTE — DISCHARGE INSTRUCTIONS
DISCHARGE INSTRUCTIONS FOR INJECTIONS     You underwent a left hip injection today    Aftermost injections, it is recommended that you relax and limit your activity for the remainder of the day unless you have been told otherwise by your pain physician.  You should not drive a car, operate machinery, or make important legal decisions unless otherwise directed by your pain physician.  You may resume your normal activity, including exercise, tomorrow.      Keep a written pain diary of how much pain relief you experienced following the injection procedure and the length of time of pain relief you experienced pain relief. Following diagnostic injections like medial branch nerve blocks, sacroiliac joint blocks, stellate ganglion injections and other blocks, it is very important you record the specific amount of pain relief you experienced immediately after the injectionand how long it lasted. Your doctor will ask you for this information at your follow up visit.     For all injections, please keep the injection site dry and inspect the site for a couple of days. You may remove the Band-Aid the day of the injection at any time.     Some discomfort, bruising or slight swelling may occur at the injection site. This is not abnormal if it occurs.  If needed you may:    -Take over the counter medication such as Tylenol or Motrin.   -Apply an ice pack for 30 minutes, 2 to 3 times a day for the first 24 hours.     You may shower today; no soaking baths, hot tubs, whirlpools or swimming pools for two days.      If you are given steroids in your injection, it may take 3-5 days for the steroid medication to take effect. You may notice a worsening of your symptoms for 1-2 days after the injection. This is not abnormal.  You may use acetaminophen, ibuprofen, or prescription medication that your doctor may have prescribed for you if you need to do so.     A few common side effects of steroids include facial flushing, sweating,  restlessness, irritability,difficulty sleeping, increase in blood sugar, and increased blood pressure. If you have diabetes, please monitor your blood sugar at least once a day for at least 5 days. If you have poorly controlled high blood pressure, monitoryour blood pressure for at least 2 days and contact your primary care physician if these numbers are unusually high for you.      If you take aspirin or non-steroidal anti-inflammatory drugs (examples are Motrin, Advil, ibuprofen, Naprosyn, Voltaren, Relafen, etc.) you may restart these this evening, but stop taking it 3 days before your next appointment, unless instructed otherwiseby your physician.      You do not need to discontinue non-aspirin-containing pain medications prior to an injection (examples: Celebrex, tramadol, hydrocodone and acetaminophen).      If you take a blood thinning medication (Coumadin, Lovenox, Fragmin,Ticlid, Plavix, Pradaxa, etc.), please discuss this with your primary care physician/cardiologist and your pain physician. These medications MUST be discontinued before you can have an injection safely, without the risk of uncontrolled bleeding. If these medications are not discontinued for an appropriate period of time, you will not be able to receivean injection.      If you are taking Coumadin, please have your INR checked the morning of your procedure and bringthe result to your appointment unless otherwise instructed. If your INR is over 1.2, your injection may need to be rescheduled to avoid uncontrolled bleeding from the needle placement.     Call [facility] at [phone] between 8am-4pm Monday - Friday if you are experiencing the following:    If you received an epidural or spinal injection:    -Headache that doesnot go away with medicine, is worse when sitting or standing up, and is greatly relieved upon lying down.   -Severe pain worse than or different than your baseline pain.   -Chills or fever (101º F or  greater).   -Drainage or signs of infection at the injection site     Go directly to the Emergency Department if you are experiencing the following and received an epidural or spinal injection:   -Abrupt weakness or progressive weakness in your legs that starts after you leave the clinic.   -Abrupt severe or worsening numbness in your legs.   -Inability to urinate after the injection or loss of bowel or bladder control without the urge to defecate or urinate.     If you have a clinical question that cannot wait until your next appointment, please call [phone] between 8am-4pm Monday - Friday or send a [employer] message. We do our best to return all non-emergency messages within 24 hours, Monday - Friday. A nurse or physician will return your message.      If you need to cancel an appointment, please call the scheduling staff at [phone] during normal business hours or leave a message at least 24 hours in advance.     If you are going to be sedated for your next procedure, you MUST have responsible adult who can legally drive accompany you home. You cannot eat or drink for eight hours prior to the planned procedure if you are going to receive sedation. You may take your non-blood thinning medications with a small sip of water.

## 2025-05-01 NOTE — DISCHARGE INSTRUCTIONS
Follow-up with your primary care physician within 1 to 2 days for further management of your current symptoms and repeat check of your blood pressure.    Follow-up with cardiology next week as scheduled    Return to the emergency department sooner with worsening of symptoms or onset of new symptoms

## 2025-05-01 NOTE — PROGRESS NOTES
Pt HR on pulse on during procedure sounded irregular. Pt denies HA, N/V, CP, SOB. After procedure, EKG ordered, which showed AF w/ RVR. Pt has no known hx arrhythmia. Will send pt to ED for further work-up and tx. Discussed with pt. Pt expressed understanding. All questions answered. Will relay to PCP.      Dorothea Chapin MD  Anesthesiologist & Interventional Pain Physician   [facility] [place]  O: [phone]  F: [phone]  12:07 PM  05/01/25

## 2025-05-01 NOTE — PROGRESS NOTES
Attestation/Supervisory note for ROSA MARIA Galarza      The patient is a 79-year-old female presenting to the emergency department from the [facility] for evaluation of possible atrial fibrillation and hypertension.  The patient was reportedly having an injection performed for chronic pain in the left hip.  She states that they told her that they were concerned about her heart rate and that she would need to be sent to the emergency room to be admitted for cardiology evaluation.  She states that she does not have any headache or visual changes.  She denies any chest pain or shortness of breath.  No abdominal pain.  No nausea or vomiting.  No diarrhea or constipation.  No urinary complaints.  No fever or chills.  No cough or congestion.  No focal weakness or numbness.  She denies having any history of atrial fibrillation.  She states that she does have a history of a heart murmur and hypertension.  All pertinent positives and negatives are recorded above.  All other systems reviewed and otherwise negative.  Vital signs with hypertension and tachycardia but otherwise within normal limits.  Physical exam with a well-nourished well-developed female in no acute distress.  HEENT exam with dry mucous membranes but otherwise unremarkable.  She does have an irregularly irregular heartbeat but does not have any evidence of airway compromise or respiratory distress.  She is able to converse without difficulty.  Abdominal exam is benign.  She does not have any gross motor, neurologic or vascular deficits on exam.  Pulses are equal bilaterally.      EKG with atrial fibrillation at 100 bpm, normal axis, normal voltage, normal ST segments, normal T waves and a right bundle branch block pattern.      IV diltiazem ordered with some improvement in her heart rate.      Diagnostic labs with mild electrolyte imbalance but otherwise unremarkable.      Initial troponin 3.  Repeat troponin 3      CT angio chest for pulmonary embolism   Final  Result   1.No pulmonary embolism or other acute intrathoracic process.   2.Cardiomegaly with severe coronary artery calcifications.   3.Age-indeterminate although possibly acute superior endplate   compression fracture of T12 with approximately 25% loss of height.   Signed by Jamie Alamo MD           The patient does not have any evidence of a STEMI on EKG or cardiac enzymes.  No events on telemetry other than atrial fibrillation which she reports is new for her.  She does not have any evidence of airway compromise or respiratory distress on exam.  Diagnostic labs with a mild electrolyte imbalance but otherwise unremarkable.  CT angio chest shows no evidence of PE or dissection.  No evidence of pneumonia or pneumothorax.  No evidence of CHF.  The patient was feeling better and was rate controlled after IV diltiazem in the emergency room.  The hospitalist,  Dr Campos,  was consulted but declined to admit the patient.  The cardiologist, Dr. Saldivar, was consulted and agreed to see the patient in his office next week.  He requested that we cut her dose of amlodipine in half and start her on metoprolol and Eliquis.  This was ordered.      The patient was released in good condition.  She will follow-up with her primary care physician within 1 to 2 days for further management of her current symptoms and repeat check of her blood pressure.  She will follow-up with cardiology as scheduled.  She will return to the emergency department sooner with worsening of symptoms or onset of new symptoms      Impression/diagnosis:  Atrial fibrillation, new onset, with rapid ventricular response  Hypertension, unspecified      Critical care time of  12 minutes billed for management of rapid A-fib, new onset, with initiation of IV diltiazem, monitoring patient is telemetry, consultation with the patient regarding results, consultation with the hospitalist, consultation with the cardiologist.  This time excludes time for billable  procedures.      critical care time billed for by me is non concurrent with time billed for by ROSA MARIA Galarza      I personally saw the patient and made/approve the management plan and take responsibility for the patient management.      I independently interpreted the following study (S) EKG and diagnostic labs      I personally discussed the patient's management with the patient      I reviewed the results of the diagnostic labs and diagnostic imaging.  Formal radiology read was completed by the radiologist.      Jody Mathews MD

## 2025-05-03 LAB — BACTERIA UR CULT: NORMAL

## 2025-05-05 ENCOUNTER — OFFICE VISIT (OUTPATIENT)
Dept: PRIMARY CARE | Facility: CLINIC | Age: 80
End: 2025-05-05
Payer: MEDICARE

## 2025-05-05 VITALS
OXYGEN SATURATION: 98 % | DIASTOLIC BLOOD PRESSURE: 80 MMHG | SYSTOLIC BLOOD PRESSURE: 126 MMHG | HEIGHT: 63 IN | HEART RATE: 88 BPM | BODY MASS INDEX: 27.46 KG/M2 | WEIGHT: 155 LBS

## 2025-05-05 DIAGNOSIS — I10 BENIGN ESSENTIAL HYPERTENSION: ICD-10-CM

## 2025-05-05 DIAGNOSIS — I48.91 ATRIAL FIBRILLATION, UNSPECIFIED TYPE (MULTI): Primary | ICD-10-CM

## 2025-05-05 PROCEDURE — 3074F SYST BP LT 130 MM HG: CPT | Performed by: PHYSICIAN ASSISTANT

## 2025-05-05 PROCEDURE — 1159F MED LIST DOCD IN RCRD: CPT | Performed by: PHYSICIAN ASSISTANT

## 2025-05-05 PROCEDURE — 1126F AMNT PAIN NOTED NONE PRSNT: CPT | Performed by: PHYSICIAN ASSISTANT

## 2025-05-05 PROCEDURE — 99214 OFFICE O/P EST MOD 30 MIN: CPT | Performed by: PHYSICIAN ASSISTANT

## 2025-05-05 PROCEDURE — 3079F DIAST BP 80-89 MM HG: CPT | Performed by: PHYSICIAN ASSISTANT

## 2025-05-05 RX ORDER — AMLODIPINE BESYLATE 5 MG/1
5 TABLET ORAL DAILY
Qty: 90 TABLET | Refills: 1 | Status: SHIPPED | OUTPATIENT
Start: 2025-05-05 | End: 2025-11-01

## 2025-05-05 ASSESSMENT — PATIENT HEALTH QUESTIONNAIRE - PHQ9
1. LITTLE INTEREST OR PLEASURE IN DOING THINGS: NOT AT ALL
2. FEELING DOWN, DEPRESSED OR HOPELESS: NOT AT ALL
SUM OF ALL RESPONSES TO PHQ9 QUESTIONS 1 AND 2: 0

## 2025-05-05 ASSESSMENT — PAIN SCALES - GENERAL: PAINLEVEL_OUTOF10: 0-NO PAIN

## 2025-05-05 NOTE — PROGRESS NOTES
"Subjective   Patient ID: Kate Bearden is a 79 y.o. female who presents for hospital discharge .    Presents for follow up - was getting injection at the hospital for her hip and had irregular heart beat during the procedure. She was determined to have an arrhythmia and sent to the ED.   Workup was complete including labs and CT angio.   Dr. Saldivar was consulted - she has started Eliquis, metoprolol and decreased amlodipine.   She denies chest pain.            Review of Systems   All other systems reviewed and are negative.      Objective   /80   Pulse 88   Ht 1.6 m (5' 3\")   Wt 70.3 kg (155 lb)   SpO2 98%   BMI 27.46 kg/m²     Physical Exam  Constitutional:       Appearance: Normal appearance.   HENT:      Right Ear: Tympanic membrane normal.      Left Ear: Tympanic membrane normal.      Mouth/Throat:      Pharynx: Oropharynx is clear.   Cardiovascular:      Rate and Rhythm: Rhythm irregular.      Heart sounds: Normal heart sounds.   Pulmonary:      Breath sounds: Normal breath sounds.   Neurological:      Mental Status: She is alert.         Assessment/Plan   Diagnoses and all orders for this visit:  Atrial fibrillation, unspecified type (Multi)  Benign essential hypertension  -     amLODIPine (Norvasc) 5 mg tablet; Take 1 tablet (5 mg) by mouth once daily.    Follow up in 6 weeks.     "

## 2025-05-07 ENCOUNTER — OFFICE VISIT (OUTPATIENT)
Facility: CLINIC | Age: 80
End: 2025-05-07
Payer: MEDICARE

## 2025-05-07 VITALS
BODY MASS INDEX: 27.81 KG/M2 | OXYGEN SATURATION: 99 % | WEIGHT: 157 LBS | DIASTOLIC BLOOD PRESSURE: 80 MMHG | SYSTOLIC BLOOD PRESSURE: 130 MMHG | HEART RATE: 92 BPM

## 2025-05-07 DIAGNOSIS — I10 PRIMARY HYPERTENSION: ICD-10-CM

## 2025-05-07 DIAGNOSIS — R06.02 SHORTNESS OF BREATH: ICD-10-CM

## 2025-05-07 DIAGNOSIS — R07.2 PRECORDIAL PAIN: Primary | ICD-10-CM

## 2025-05-07 DIAGNOSIS — I10 BENIGN ESSENTIAL HYPERTENSION: ICD-10-CM

## 2025-05-07 DIAGNOSIS — I48.91 ATRIAL FIBRILLATION, UNSPECIFIED TYPE (MULTI): ICD-10-CM

## 2025-05-07 DIAGNOSIS — E78.00 PURE HYPERCHOLESTEROLEMIA: ICD-10-CM

## 2025-05-07 LAB
Q ONSET: 223 MS
QRS COUNT: 17 BEATS
QRS DURATION: 128 MS
QT INTERVAL: 380 MS
QTC CALCULATION(BAZETT): 490 MS
QTC FREDERICIA: 450 MS
R AXIS: 64 DEGREES
T AXIS: -9 DEGREES
T OFFSET: 413 MS
VENTRICULAR RATE: 100 BPM

## 2025-05-07 PROCEDURE — 3075F SYST BP GE 130 - 139MM HG: CPT | Performed by: INTERNAL MEDICINE

## 2025-05-07 PROCEDURE — 99215 OFFICE O/P EST HI 40 MIN: CPT | Performed by: INTERNAL MEDICINE

## 2025-05-07 PROCEDURE — 1126F AMNT PAIN NOTED NONE PRSNT: CPT | Performed by: INTERNAL MEDICINE

## 2025-05-07 PROCEDURE — 1160F RVW MEDS BY RX/DR IN RCRD: CPT | Performed by: INTERNAL MEDICINE

## 2025-05-07 PROCEDURE — 1159F MED LIST DOCD IN RCRD: CPT | Performed by: INTERNAL MEDICINE

## 2025-05-07 PROCEDURE — 99205 OFFICE O/P NEW HI 60 MIN: CPT | Performed by: INTERNAL MEDICINE

## 2025-05-07 PROCEDURE — 3079F DIAST BP 80-89 MM HG: CPT | Performed by: INTERNAL MEDICINE

## 2025-05-07 RX ORDER — ATORVASTATIN CALCIUM 40 MG/1
40 TABLET, FILM COATED ORAL DAILY
Qty: 90 TABLET | Refills: 3 | Status: SHIPPED | OUTPATIENT
Start: 2025-05-07 | End: 2026-05-07

## 2025-05-07 RX ORDER — REGADENOSON 0.08 MG/ML
0.4 INJECTION, SOLUTION INTRAVENOUS
OUTPATIENT
Start: 2025-05-07

## 2025-05-07 RX ORDER — METOPROLOL SUCCINATE 50 MG/1
50 TABLET, EXTENDED RELEASE ORAL DAILY
Qty: 90 TABLET | Refills: 3 | Status: SHIPPED | OUTPATIENT
Start: 2025-05-07 | End: 2026-05-07

## 2025-05-07 RX ORDER — AMINOPHYLLINE 25 MG/ML
125 INJECTION, SOLUTION INTRAVENOUS ONCE AS NEEDED
OUTPATIENT
Start: 2025-05-07

## 2025-05-07 ASSESSMENT — ENCOUNTER SYMPTOMS
DEPRESSION: 0
LOSS OF SENSATION IN FEET: 0
OCCASIONAL FEELINGS OF UNSTEADINESS: 0

## 2025-05-07 ASSESSMENT — PAIN SCALES - GENERAL: PAINLEVEL_OUTOF10: 0-NO PAIN

## 2025-05-07 ASSESSMENT — LIFESTYLE VARIABLES: TOTAL SCORE: 0

## 2025-05-07 NOTE — PATIENT INSTRUCTIONS
Stop simvastatin.    Increase metoprolol to 50 mg oral 1 tablet daily.  I sent you new prescription.    Start atorvastatin 40 mg 1 tablet at bedtime.    Call centralized scheduling for echocardiogram and stress test you can have it done at [facility].    We will arrange for cardioversion at [facility] [place].  Skylar will call you to tell you about dates we will do it after 3 weeks from now but make sure you are taking your Eliquis every day twice a day accurately.    I will see you in 3 months   none

## 2025-05-07 NOTE — PROGRESS NOTES
[facility] [place]    Interventional Cardiology      Consulting Physician:  This is a very pleasant 79-year-old female with no prior cardiac history except for heart murmur.  Patient presents to my office after recent ER evaluation for atrial fibrillation.  Patient was undergoing procedure where she was found to have irregular heart rate with a heart rate of 130 bpm.  Patient was instructed to go to the emergency room.  In the emergency room patient received diltiazem and heart rate was controlled.  Started on metoprolol and Eliquis.  Patient was referred to my office for further follow-up.  Patient denies having palpitations.  She does complain of shortness of breath with moderate activity.  Has occasional episodes of chest tightness random nonspecific.  Currently remains in atrial fibrillation.  Underwent chest CT on May 1, 2025 that showed heavy calcification of the coronaries with no pulmonary embolism .     Reason for the consult:    History of present illness:    Medical History[1]    Surgical History[2]    Allergies[3]     reports that she has never smoked. She has never used smokeless tobacco. She reports that she does not drink alcohol and does not use drugs.    Family History[4]    Patient's Medications   New Prescriptions    No medications on file   Previous Medications    ALENDRONATE (FOSAMAX) 70 MG TABLET    TAKE 1 TABLET BY MOUTH 30 MINUTES BEFORE THE 1ST FOOD , BEVERAGE OR MEDICINE OF THE DAY WITH PLAIN WATER as directed    AMLODIPINE (NORVASC) 5 MG TABLET    Take 1 tablet (5 mg) by mouth once daily.    APIXABAN (ELIQUIS) 5 MG (74 TABS) TABLET    Take 2 tablets (10 mg) by mouth 2 times a day for 7 days, then take 1 tablet (5 mg) by mouth 2 times a day.    METOPROLOL SUCCINATE XL (TOPROL-XL) 25 MG 24 HR TABLET    Take 1 tablet (25 mg) by mouth once daily for 20 days. Do not crush or chew.    SIMVASTATIN (ZOCOR) 10 MG TABLET    Take 1 tablet (10 mg) by mouth  once daily in the evening.    TRAMADOL (ULTRAM) 50 MG TABLET    Take 1 tablet (50 mg) by mouth every 12 hours if needed for moderate pain (4 - 6). Do not fill before March 13, 2025.   Modified Medications    No medications on file   Discontinued Medications    No medications on file         Review of Systems  10 point review of systems otherwise negative     Objective   Physical Exam  General: Patient in no acute distress   HEENT: Atraumatic normocephalic.  Neck: Supple, jugular venous pressure within normal limit.  No bruits  Lungs: Clear to auscultation bilaterally  Cardiovascular: Regular rate and rhythm, normal heart sounds, no murmurs rubs or gallops  Abdomen: Soft nontender nondistended.  Normal bowel sounds.  Extremities: Warm to touch, no edema.  Neurologic examination: Patient is awake alert oriented x3.  Psychiatric examination: Patient denies being depressed or suicidal.  Good insight  Vascular examination: Pulses intact bilaterally     Lab Review   Admission on 05/01/2025, Discharged on 05/01/2025   Component Date Value    WBC 05/01/2025 5.5     nRBC 05/01/2025 0.0     RBC 05/01/2025 4.44     Hemoglobin 05/01/2025 14.5     Hematocrit 05/01/2025 45.0     MCV 05/01/2025 101 (H)     MCH 05/01/2025 32.7     MCHC 05/01/2025 32.2     RDW 05/01/2025 13.2     Platelets 05/01/2025 272     Neutrophils % 05/01/2025 72.3     Immature Granulocytes %,* 05/01/2025 0.4     Lymphocytes % 05/01/2025 17.6     Monocytes % 05/01/2025 7.6     Eosinophils % 05/01/2025 1.6     Basophils % 05/01/2025 0.5     Neutrophils Absolute 05/01/2025 3.97     Immature Granulocytes Ab* 05/01/2025 0.02     Lymphocytes Absolute 05/01/2025 0.97     Monocytes Absolute 05/01/2025 0.42     Eosinophils Absolute 05/01/2025 0.09     Basophils Absolute 05/01/2025 0.03     Glucose 05/01/2025 114 (H)     Sodium 05/01/2025 140     Potassium 05/01/2025 3.9     Chloride 05/01/2025 108 (H)     Bicarbonate 05/01/2025 21     Anion Gap 05/01/2025 15     Urea  Nitrogen 05/01/2025 25 (H)     Creatinine 05/01/2025 0.90     eGFR 05/01/2025 65     Calcium 05/01/2025 9.1     Albumin 05/01/2025 4.2     Alkaline Phosphatase 05/01/2025 52     Total Protein 05/01/2025 7.4     AST 05/01/2025 18     Bilirubin, Total 05/01/2025 0.7     ALT 05/01/2025 10     Magnesium 05/01/2025 2.05     Ventricular Rate 05/01/2025 100     QRS Duration 05/01/2025 128     QT Interval 05/01/2025 380     QTC Calculation(Bazett) 05/01/2025 490     R Axis 05/01/2025 64     T Coden 05/01/2025 -9     QRS Count 05/01/2025 17     Q Onset 05/01/2025 223     T Offset 05/01/2025 413     QTC Fredericia 05/01/2025 450     BNP 05/01/2025 203 (H)     Thyroid Stimulating Horm* 05/01/2025 0.91     Troponin I, High Sensiti* 05/01/2025 3     Troponin I, High Sensiti* 05/01/2025 3     Color, Urine 05/01/2025 Light-Yellow     Appearance, Urine 05/01/2025 Clear     Specific Gravity, Urine 05/01/2025 >1.050 (N)     pH, Urine 05/01/2025 5.5     Protein, Urine 05/01/2025 NEGATIVE     Glucose, Urine 05/01/2025 Normal     Blood, Urine 05/01/2025 NEGATIVE     Ketones, Urine 05/01/2025 NEGATIVE     Bilirubin, Urine 05/01/2025 NEGATIVE     Urobilinogen, Urine 05/01/2025 Normal     Nitrite, Urine 05/01/2025 NEGATIVE     Leukocyte Esterase, Urine 05/01/2025 75 Carroll/uL (A)     WBC, Urine 05/01/2025 NONE     RBC, Urine 05/01/2025 NONE     Urine Culture 05/01/2025 Growth indicates contamination with periurethral rayray. Repeat culture if clinically indicated.    Hospital Outpatient Visit on 05/01/2025   Component Date Value    Ventricular Rate 05/01/2025 117     QRS Duration 05/01/2025 132     QT Interval 05/01/2025 360     QTC Calculation(Bazett) 05/01/2025 502     R Axis 05/01/2025 51     T Axis 05/01/2025 -16     QRS Count 05/01/2025 19     Q Onset 05/01/2025 222     T Offset 05/01/2025 402     QTC Fredericia 05/01/2025 450         Assessment/Plan      This is a very pleasant 79-year-old female with no prior cardiac history except for  heart murmur.  Patient presents to my office after recent ER evaluation for atrial fibrillation.  Patient was undergoing procedure where she was found to have irregular heart rate with a heart rate of 130 bpm.  Patient was instructed to go to the emergency room.  In the emergency room patient received diltiazem and heart rate was controlled.  Started on metoprolol and Eliquis.  Patient was referred to my office for further follow-up.  Patient denies having palpitations.  She does complain of shortness of breath with moderate activity.  Has occasional episodes of chest tightness random nonspecific.  Currently remains in atrial fibrillation.  Underwent chest CT on May 1, 2025 that showed heavy calcification of the coronaries with no pulmonary embolism .     Patient with onset atrial fibrillation.  Was found accidentally.  Not very symptomatic.  I will attempt at least 1 time cardioversion to keep her in sinus rhythm.  We will continue for now Eliquis for 3 more weeks and then we will arrange for outpatient cardioversion.  Meanwhile patient will have an echocardiogram done as initial workup for atrial fibrillation since her ejection fraction any major structural heart issues.  Will also arrange for Lexiscan nuclear stress test to rule out ischemia in the presence of heavy calcification in the coronaries occasional chest tightness.  We will also stop simvastatin and switch her to atorvastatin 40 mg oral daily for better protection.  Continue Eliquis.  Had lengthy discussion with her about the finding of atrial fibrillation management.  Discussed with her lifestyle modification.  Will follow-up in 3 months.    Jose Saldivar MD           [1] History reviewed. No pertinent past medical history.  [2]   Past Surgical History:  Procedure Laterality Date    TOTAL KNEE ARTHROPLASTY Right 07/01/2019   [3] No Known Allergies  [4] No family history on file.

## 2025-05-08 ENCOUNTER — TELEPHONE (OUTPATIENT)
Dept: CARDIOLOGY | Facility: CLINIC | Age: 80
End: 2025-05-08
Payer: MEDICARE

## 2025-05-08 PROBLEM — R07.9 CHEST PAIN: Status: ACTIVE | Noted: 2025-05-08

## 2025-05-08 NOTE — TELEPHONE ENCOUNTER
Patient needs cardioversion in the next few weeks per Dr Saldivar  Checked with insurance no PA required

## 2025-05-12 NOTE — TELEPHONE ENCOUNTER
Scheduled per Dr Saldivar tried to call patient no answer    6/9/2025 Status: Ida   Time: 9:00 AM  9:00 AM    Arrive By: 8:45 AM

## 2025-05-23 ENCOUNTER — OFFICE VISIT (OUTPATIENT)
Dept: PAIN MEDICINE | Facility: CLINIC | Age: 80
End: 2025-05-23
Payer: MEDICARE

## 2025-05-23 VITALS — SYSTOLIC BLOOD PRESSURE: 124 MMHG | OXYGEN SATURATION: 98 % | HEART RATE: 87 BPM | DIASTOLIC BLOOD PRESSURE: 80 MMHG

## 2025-05-23 DIAGNOSIS — Z79.01 ANTICOAGULATED: ICD-10-CM

## 2025-05-23 DIAGNOSIS — M25.552 LEFT HIP PAIN: Primary | ICD-10-CM

## 2025-05-23 DIAGNOSIS — M16.12 PRIMARY OSTEOARTHRITIS OF LEFT HIP: ICD-10-CM

## 2025-05-23 PROCEDURE — 99213 OFFICE O/P EST LOW 20 MIN: CPT | Performed by: ANESTHESIOLOGY

## 2025-05-23 PROCEDURE — 3079F DIAST BP 80-89 MM HG: CPT | Performed by: ANESTHESIOLOGY

## 2025-05-23 PROCEDURE — 1160F RVW MEDS BY RX/DR IN RCRD: CPT | Performed by: ANESTHESIOLOGY

## 2025-05-23 PROCEDURE — G2211 COMPLEX E/M VISIT ADD ON: HCPCS | Performed by: ANESTHESIOLOGY

## 2025-05-23 PROCEDURE — 3074F SYST BP LT 130 MM HG: CPT | Performed by: ANESTHESIOLOGY

## 2025-05-23 PROCEDURE — 1159F MED LIST DOCD IN RCRD: CPT | Performed by: ANESTHESIOLOGY

## 2025-05-23 PROCEDURE — 1036F TOBACCO NON-USER: CPT | Performed by: ANESTHESIOLOGY

## 2025-05-23 ASSESSMENT — PATIENT HEALTH QUESTIONNAIRE - PHQ9
2. FEELING DOWN, DEPRESSED OR HOPELESS: NOT AT ALL
SUM OF ALL RESPONSES TO PHQ9 QUESTIONS 1 AND 2: 0
1. LITTLE INTEREST OR PLEASURE IN DOING THINGS: NOT AT ALL

## 2025-05-23 ASSESSMENT — ENCOUNTER SYMPTOMS
LOSS OF SENSATION IN FEET: 0
HEMATOLOGIC/LYMPHATIC NEGATIVE: 1
RESPIRATORY NEGATIVE: 1
ENDOCRINE NEGATIVE: 1
PSYCHIATRIC NEGATIVE: 1
CONSTITUTIONAL NEGATIVE: 1
DEPRESSION: 0
GASTROINTESTINAL NEGATIVE: 1
NEUROLOGICAL NEGATIVE: 1
EYES NEGATIVE: 1
ARTHRALGIAS: 1
OCCASIONAL FEELINGS OF UNSTEADINESS: 0
CARDIOVASCULAR NEGATIVE: 1

## 2025-05-23 ASSESSMENT — PAIN SCALES - GENERAL
PAINLEVEL_OUTOF10: 0 - NO PAIN
PAINLEVEL_OUTOF10: 0-NO PAIN

## 2025-05-23 ASSESSMENT — PAIN - FUNCTIONAL ASSESSMENT: PAIN_FUNCTIONAL_ASSESSMENT: 0-10

## 2025-05-23 NOTE — PROGRESS NOTES
PAIN MANAGEMENT FOLLOW-UP OFFICE NOTE    Date of Service: 5/23/2025    SUBJECTIVE    CHIEF COMPLAINT: L hip pain    HISTORY OF PRESENT ILLNESS    Kate Bearden is a 79 y.o. female with PMH HTN, OA, AF on ELIQUIS who presents for follow-up    On 5/1, pt underwent L hip CSI with 100% ongoing relief. Was found to be in AF w RVR that day and RVR tx'd in ER, but AF persisted. Pt now on Eliquis with plan for cardioversion with Dr Saldivar 6/9.     Pt denies new-onset numbness, weakness, bowel/bladder incontinence.  Pt denies recent infection, allergy to Latex/iodine/contrast. Patient is currently taking the following blood thinner(s): N/A    Procedure log:  -L hip CSI 5/1/25: 100% ongoing relief    REVIEW OF SYSTEMS  Review of Systems   Constitutional: Negative.    HENT: Negative.     Eyes: Negative.    Respiratory: Negative.     Cardiovascular: Negative.    Gastrointestinal: Negative.    Endocrine: Negative.    Musculoskeletal:  Positive for arthralgias.   Skin: Negative.    Neurological: Negative.    Hematological: Negative.    Psychiatric/Behavioral: Negative.         PAST MEDICAL HISTORY  History reviewed. No pertinent past medical history.  Past Surgical History:   Procedure Laterality Date    TOTAL KNEE ARTHROPLASTY Right 07/01/2019     No family history on file.    CURRENT MEDICATIONS  Current Outpatient Medications   Medication Sig Dispense Refill    alendronate (Fosamax) 70 mg tablet TAKE 1 TABLET BY MOUTH 30 MINUTES BEFORE THE 1ST FOOD , BEVERAGE OR MEDICINE OF THE DAY WITH PLAIN WATER as directed 4 tablet 11    amLODIPine (Norvasc) 5 mg tablet Take 1 tablet (5 mg) by mouth once daily. 90 tablet 1    apixaban (Eliquis) 5 mg (74 tabs) tablet Take 2 tablets (10 mg) by mouth 2 times a day for 7 days, then take 1 tablet (5 mg) by mouth 2 times a day. 74 tablet 0    atorvastatin (Lipitor) 40 mg tablet Take 1 tablet (40 mg) by mouth once daily. 90 tablet 3    metoprolol succinate XL (Toprol-XL) 50 mg 24 hr tablet  Take 1 tablet (50 mg) by mouth once daily. Do not crush or chew. 90 tablet 3    traMADol (Ultram) 50 mg tablet Take 1 tablet (50 mg) by mouth every 12 hours if needed for moderate pain (4 - 6). Do not fill before March 13, 2025. 60 tablet 2     No current facility-administered medications for this visit.       ALLERGIES AND DRUG REACTIONS  No Known Allergies       OBJECTIVE  Visit Vitals  /80   Pulse 87   SpO2 98%   Smoking Status Never       Last Recorded Pain Score (if available):           Pain Score: 0-No pain     Physical Exam  Vitals and nursing note reviewed.       General: Sitting in chair, NAD  Head: NCAT  Eyes: Sclera/conjunctiva clear, EOMI, PERRL  Nose/mouth: MMM  CV: Good distal pulses  Lungs: Good/equal chest excursion  Abdomen: Soft, ND  Ext: No cyanosis/edema  MSK: L hip: neg log roll. No leg-length discrepancy.  improved pain on internal rotation      Neuro: AAOx3, CN grossly nl   \  Psych: affect nl  Skin: no rash/lesions      REVIEW OF LABORATORY DATA  I have reviewed the following lab results:  WBC   Date Value Ref Range Status   05/01/2025 5.5 4.4 - 11.3 x10*3/uL Final     RBC   Date Value Ref Range Status   05/01/2025 4.44 4.00 - 5.20 x10*6/uL Final     Hemoglobin   Date Value Ref Range Status   05/01/2025 14.5 12.0 - 16.0 g/dL Final     Hematocrit   Date Value Ref Range Status   05/01/2025 45.0 36.0 - 46.0 % Final     MCV   Date Value Ref Range Status   05/01/2025 101 (H) 80 - 100 fL Final     MCH   Date Value Ref Range Status   05/01/2025 32.7 26.0 - 34.0 pg Final     MCHC   Date Value Ref Range Status   05/01/2025 32.2 32.0 - 36.0 g/dL Final     RDW   Date Value Ref Range Status   05/01/2025 13.2 11.5 - 14.5 % Final     Platelets   Date Value Ref Range Status   05/01/2025 272 150 - 450 x10*3/uL Final     MPV   Date Value Ref Range Status   07/02/2019 9.6 7.0 - 12.6 CU Final     Sodium   Date Value Ref Range Status   05/01/2025 140 136 - 145 mmol/L Final     Potassium   Date Value Ref  "Range Status   05/01/2025 3.9 3.5 - 5.3 mmol/L Final     Comment:     MILD HEMOLYSIS DETECTED. The result may be falsely elevated due to hemolysis or other interferents. Clinical correlation is recommended. Repeat testing may be considered.     Bicarbonate   Date Value Ref Range Status   05/01/2025 21 21 - 32 mmol/L Final     Urea Nitrogen   Date Value Ref Range Status   05/01/2025 25 (H) 6 - 23 mg/dL Final     Calcium   Date Value Ref Range Status   05/01/2025 9.1 8.6 - 10.3 mg/dL Final     No results found for: \"PROTIME\", \"PTT\", \"INR\", \"FIBRINOGEN\"      REVIEW OF RADIOLOGY   I have reviewed the following:  Radiology Studies           XR L hip 4/14/25:  Mild to moderate bilateral hip osteoarthrosis, predominantly in the  form of space narrowing without marked marginal osteophytosis. Note  is also made of probable cam type morphology of the right and left  femoral head neck junction. Moderate lower lumbar spondylosis. No  fracture or dislocation. No osseous lesion.      IMPRESSION:  Mild-to-moderate bilateral hip osteoarthrosis with cam type  morphology of the femoral head neck junctions        XR L-spine 4/14/25:    Grade 1 anterolisthesis L5 on S1 and L3 on L4. Moderate to marked  spondylosis throughout the lumbar spine most severe at the mid and  lower levels. Equivocal age-indeterminate compression deformity of  T12 vertebral body. Less than 25% height loss. Levocurvature of the  thoracolumbar junction noted. Surgical clips overlie the right upper  quadrant.      IMPRESSION:  Moderate to marked lumbar spondylosis. Grade 1 anterolisthesis L5 on  S1 and L3 on L4.,        ASSESSMENT & PLAN  Kate Bearden is a 79 y.o. female with PMH HTN, OA, AF on ELIQUIS who presents for follow-up    1) L hip pain  -Since 2023 with radiation to L groin and reproducible on exam likely 2/2 OA- consider radiculitis  -Refractive to >1 y conservative tx including Tylenol, tramadol  -Reviewed/discussed XR L hip & L-spine 4/14/25: " mod-severe lumbar spondylosis with G1 L5-S1 and L3-4 listhesis, mild-mod BL hip OA w/ CAM deformities  -L hip CSI 5/1/25: 100% ongoing relief  -Cont HEP  -F/U 3 mo            Today's visit involved continuation of chronic pain care. In the context of the complexity of this patient's chronic pain diagnosis, long-term expectations and care planning discussed. Adequate time taken to ensure patient understanding and answer questions. Imaging studies ordered are placed do elucidate the patient's diagnosis, but also to evaluate the patient's candidacy for procedural and surgical interventions. The risks and benefits of these potential interventions are detailed as above.           Dorothea Chapin MD  Anesthesiologist & Interventional Pain Physician   [facility]  O: [phone]  F: [phone]  10:19 AM  05/23/25

## 2025-05-27 DIAGNOSIS — M81.8 OTHER OSTEOPOROSIS WITHOUT CURRENT PATHOLOGICAL FRACTURE: ICD-10-CM

## 2025-05-27 RX ORDER — ALENDRONATE SODIUM 70 MG/1
TABLET ORAL
Qty: 4 TABLET | Refills: 11 | Status: SHIPPED | OUTPATIENT
Start: 2025-05-27

## 2025-05-28 ENCOUNTER — TELEPHONE (OUTPATIENT)
Dept: CARDIOLOGY | Facility: CLINIC | Age: 80
End: 2025-05-28
Payer: MEDICARE

## 2025-05-28 DIAGNOSIS — I48.91 ATRIAL FIBRILLATION, UNSPECIFIED TYPE (MULTI): ICD-10-CM

## 2025-06-03 NOTE — H&P
History Of Present Illness  Kate Bearden is a 79 y.o. female presenting with atrial fib.  5/1/2025 present to ED for evaluation of atrial fib post hip injection from pain med. Found to have irregular heart rate with a rate of 130 bpm. Received diltiazem in ED. Discharged home on metoprolol and Eliquis. PMH includes heart murmur, HTN, HLD, atrial fib.    Pt reports taking Eliquis bid, last dose yesterday afternoon, did not take am dose today, denies missed doses past 30 days.  Will order Eliquis 5mg po X1 pre-procedure.  EKG shows atrial fib      Past Medical History:  Medical History[1]     Past Surgical History:  Surgical History[2]       Social History:  Social History[3]    Family History:  Family History[4]     Allergies:  RX Allergies[5]     Home Medications:  Current Outpatient Medications   Medication Instructions    alendronate (Fosamax) 70 mg tablet TAKE 1 TABLET BY MOUTH 30 MINUTES BEFORE THE 1ST FOOD, BEVERAGE OR MEDICINE OF THE DAY WITH PLAIN WATER AS DIRECTED    amLODIPine (NORVASC) 5 mg, oral, Daily    apixaban (Eliquis) 5 mg (74 tabs) tablet Take 2 tablets (10 mg) by mouth 2 times a day for 7 days, then take 1 tablet (5 mg) by mouth 2 times a day.    apixaban (ELIQUIS) 5 mg, oral, 2 times daily    atorvastatin (LIPITOR) 40 mg, oral, Daily    metoprolol succinate XL (TOPROL-XL) 50 mg, oral, Daily, Do not crush or chew.    traMADol (ULTRAM) 50 mg, oral, Every 12 hours PRN       Inpatient Medications:  Scheduled Medications[6]  PRN Medications[7]  Continuous Medications[8]      Review of Systems   Constitutional: Negative.  Negative for fatigue.   HENT: Negative.     Eyes: Negative.    Respiratory: Negative.  Negative for shortness of breath.    Cardiovascular:  Positive for palpitations. Negative for chest pain.   Gastrointestinal: Negative.    Endocrine: Negative.    Genitourinary: Negative.    Musculoskeletal: Negative.    Skin: Negative.    Allergic/Immunologic: Negative.    Neurological:  "Negative.    Hematological: Negative.    Psychiatric/Behavioral: Negative.            Physical Exam  Constitutional:       General: She is awake. She is not in acute distress.     Appearance: Normal appearance. She is not ill-appearing.   HENT:      Head: Normocephalic and atraumatic.      Mouth/Throat:      Mouth: Mucous membranes are moist.      Pharynx: Oropharynx is clear.   Cardiovascular:      Rate and Rhythm: Normal rate. Rhythm irregular.      Pulses:           Radial pulses are 2+ on the right side and 2+ on the left side.        Dorsalis pedis pulses are 2+ on the right side and 2+ on the left side.      Heart sounds: Murmur heard.   Pulmonary:      Effort: Pulmonary effort is normal.      Breath sounds: Normal breath sounds.   Abdominal:      General: Bowel sounds are normal.      Palpations: Abdomen is soft.   Musculoskeletal:         General: Normal range of motion.      Cervical back: Normal range of motion and neck supple.      Right lower leg: No edema.      Left lower leg: No edema.   Skin:     General: Skin is warm and dry.      Capillary Refill: Capillary refill takes less than 2 seconds.   Neurological:      General: No focal deficit present.      Mental Status: She is alert and oriented to person, place, and time. Mental status is at baseline.      Cranial Nerves: Cranial nerves 2-12 are intact.   Psychiatric:         Mood and Affect: Mood and affect normal.         Behavior: Behavior normal.        Sedation Plan    ASA 3     Mallampati class: II.           NPO since 1700 on 6/82025    Last Recorded Vitals  Blood pressure (!) 145/95, pulse 84, temperature 35.2 °C (95.4 °F), resp. rate 16, height 1.6 m (5' 3\"), weight 68 kg (150 lb), SpO2 95%.         Vitals from the Past 24 Hours  Heart Rate:  [84]   Temp:  [35.2 °C (95.4 °F)]   Resp:  [16]   BP: (145)/(95)   Height:  [160 cm (5' 3\")]   Weight:  [68 kg (150 lb)]   SpO2:  [95 %]          Relevant Results    Labs    POCT Glucose:      POCT Urine " "Pregnancy:       CBC:   Recent Labs     05/01/25  1227 07/02/19  0454 06/19/19  1501   WBC 5.5 7.7 6.2   HGB 14.5 11.8* 14.5   HCT 45.0 37.4 45.0*    237 306   * 98.4 95.5     BMP/CMP:   Recent Labs     05/01/25  1227 08/13/24  0851 08/22/23  0946 09/24/21  0940 12/16/19  0925 07/02/19  0454 06/19/19  1501 12/10/18  0859    143 143 140 139 137   < > 143   K 3.9 4.2 4.2 4.5 4.2 4.8   < > 4.4   * 107 107 105 101 104   < > 105   BUN 25* 13 10 11 10 16   < > 10   CREATININE 0.90 0.70 0.6 0.7 0.7 0.9   < > 0.7   CO2 21 27 22* 20* 24 23*   < > 24   CALCIUM 9.1 9.1 8.9 9.3 9.7 8.1*   < > 9.4   PROT 7.4 7.3 7.6 7.8 8.0*  --   --  7.4   BILITOT 0.7 1.2 1.0 0.5 0.6  --   --  0.9   ALKPHOS 52 63 91 92 85  --   --  82   ALT 10 15 12 12 8  --   --  12   AST 18 19 22 24 19  --   --  18   GLUCOSE 114* 92 91 78 99 149*   < > 108*    < > = values in this interval not displayed.      Magnesium:   Recent Labs     05/01/25  1227   MG 2.05     Lipid Panel:   Recent Labs     08/13/24  0851 08/22/23  0946 09/24/21  0940 12/16/19  0925 12/10/18  0859 07/30/18  0932 01/24/18  0920   CHOL 141 173 166 153 200 138 144   HDL 55.6 65 59 54 63 58 71   CHHDL 2.5 2.7 2.8 2.8 3.2 2.4 2.0   VLDL 18  --   --   --   --   --   --    TRIG 91 77 83 83 96 76 60   NHDL 85  --   --   --   --   --   --      Cardiac       No lab exists for component: \"CK\", \"CKMBP\"   Hemoglobin A1C: No results for input(s): \"HGBA1C\" in the last 26075 hours.  TSH/ Free T4:   Recent Labs     05/01/25  1227   TSH 0.91     Iron:   Recent Labs     05/01/25  1227   *     Coag:     ABO: No results found for: \"ABO\"  STUDY:  CT Angiogram of the Chest; 5/1/2025 1:36 PM  INDICATION:  New onset atrial fibrillation.  Recent swelling of right leg.  COMPARISON:  None Available.  ACCESSION NUMBER(S):  [id number]  ORDERING CLINICIAN:  INOCENCIA MANCIA  TECHNIQUE:  CTA of the chest was performed with intravenous contrast.   Images are reviewed and processed at a " workstation according to the CT  angiogram protocol with 3-D and/or MIP post processing imaging  generated.  Omnipaque 350 75 mL was administered intravenously.    Automated mA/kV exposure control was utilized and patient examination  was performed in strict accordance with principles of ALARA.  FINDINGS:  Pulmonary arteries are adequately opacified without acute or chronic  filling defects.  The thoracic aorta is normal in course and caliber  without dissection or aneurysm.  Heart is enlarged with severe coronary artery calcifications..   Thoracic lymph nodes are not enlarged.  There is no pleural effusion, pleural thickening, or pneumothorax.   The airways are patent.  Lungs are clear without consolidation, interstitial disease, or  suspicious nodules.  Upper abdomen demonstrates no acute pathology.  Age-indeterminate although possibly acute superior endplate  compression fracture of T12 with approximately 25% loss of height..   No suspicious bony lesions.  IMPRESSION:  1.No pulmonary embolism or other acute intrathoracic process.  2.Cardiomegaly with severe coronary artery calcifications.  3.Age-indeterminate although possibly acute superior endplate  compression fracture of T12 with approximately 25% loss of height.  Signed by Jamie Alamo MD      Past Cardiology Tests (Last 3 Years):    EKG:  Recent Labs     05/01/25  1247 05/01/25  1200   VENTRATE 100 117   QRSDUR 128 132   QTCFRED 450 450   QTCCALCB 490 502     Encounter Date: 05/01/25   ECG 12 lead   Result Value    Ventricular Rate 100    QRS Duration 128    QT Interval 380    QTC Calculation(Bazett) 490    R Axis 64    T Axis -9    QRS Count 17    Q Onset 223    T Offset 413    QTC Fredericia 450    Narrative    Atrial fibrillation  Right bundle branch block  Abnormal ECG  When compared with ECG of 01-MAY-2025 11:55, (unconfirmed)  No significant change was found  Confirmed by Hernán Maurer ([id number]) on 5/7/2025 12:38:03 PM     Echo:  Echocardiogram: No  "results found for this or any previous visit from the past 1800 days.    Ejection Fractions:  No results found for: \"EF\"  Cath:  Coronary Angiography: No results found for this or any previous visit from the past 1800 days.    Right Heart Cath: No results found for this or any previous visit from the past 1800 days.    Stress Test:  Nuclear:No results found for this or any previous visit from the past 1800 days.    Metabolic Stress: No results found for this or any previous visit from the past 1800 days.    Cardiac Imaging:  Cardiac Scoring: No results found for this or any previous visit from the past 1800 days.    Cardiac MRI: No results found for this or any previous visit from the past 1800 days.         Assessment/Plan  Assessment/Plan   Assessment & Plan  Atrial fibrillation, unspecified type (Multi)      Atrial fib  HTN    Here for cardioversion with Dr. aSldivar on 6/9/2025.   Eliquis 5mg po X1 pre-procedure        NP discussed with Dr. Saldivar regarding plan of care/ discharge plan      I spent 35 minutes in the professional and overall care of this patient.      Lesley Becerril, APRN-CNP         [1] History reviewed. No pertinent past medical history.  [2]   Past Surgical History:  Procedure Laterality Date    TOTAL KNEE ARTHROPLASTY Right 07/01/2019   [3]   Social History  Tobacco Use    Smoking status: Never    Smokeless tobacco: Never   Substance Use Topics    Alcohol use: Never    Drug use: Never   [4] No family history on file.  [5] No Known Allergies  [6]   Scheduled medications   Medication Dose Route Frequency    apixaban  5 mg oral Once    oxygen   inhalation Continuous - Inhalation   [7]   PRN medications   Medication   [8]   Continuous Medications   Medication Dose Last Rate     "

## 2025-06-09 ENCOUNTER — ANESTHESIA (OUTPATIENT)
Dept: CARDIOLOGY | Facility: HOSPITAL | Age: 80
End: 2025-06-09
Payer: MEDICARE

## 2025-06-09 ENCOUNTER — HOSPITAL ENCOUNTER (OUTPATIENT)
Dept: CARDIOLOGY | Facility: HOSPITAL | Age: 80
Setting detail: OUTPATIENT SURGERY
Discharge: HOME | End: 2025-06-09
Payer: MEDICARE

## 2025-06-09 ENCOUNTER — HOSPITAL ENCOUNTER (OUTPATIENT)
Dept: CARDIOLOGY | Facility: HOSPITAL | Age: 80
Discharge: HOME | End: 2025-06-09
Payer: MEDICARE

## 2025-06-09 ENCOUNTER — PHARMACY VISIT (OUTPATIENT)
Dept: PHARMACY | Facility: CLINIC | Age: 80
End: 2025-06-09
Payer: COMMERCIAL

## 2025-06-09 ENCOUNTER — ANESTHESIA EVENT (OUTPATIENT)
Dept: CARDIOLOGY | Facility: HOSPITAL | Age: 80
End: 2025-06-09
Payer: MEDICARE

## 2025-06-09 VITALS
HEART RATE: 84 BPM | WEIGHT: 150 LBS | RESPIRATION RATE: 16 BRPM | OXYGEN SATURATION: 95 % | SYSTOLIC BLOOD PRESSURE: 145 MMHG | DIASTOLIC BLOOD PRESSURE: 95 MMHG | BODY MASS INDEX: 26.58 KG/M2 | TEMPERATURE: 95.4 F | HEIGHT: 63 IN

## 2025-06-09 DIAGNOSIS — I48.91 ATRIAL FIBRILLATION, UNSPECIFIED TYPE (MULTI): ICD-10-CM

## 2025-06-09 DIAGNOSIS — I10 BENIGN ESSENTIAL HYPERTENSION: Primary | ICD-10-CM

## 2025-06-09 PROCEDURE — 93005 ELECTROCARDIOGRAM TRACING: CPT | Mod: 59

## 2025-06-09 PROCEDURE — 92960 CARDIOVERSION ELECTRIC EXT: CPT | Performed by: INTERNAL MEDICINE

## 2025-06-09 PROCEDURE — 93010 ELECTROCARDIOGRAM REPORT: CPT | Performed by: INTERNAL MEDICINE

## 2025-06-09 PROCEDURE — A92960 PR CARDIOVERSION, ELECTIVE;EXTERN: Performed by: NURSE ANESTHETIST, CERTIFIED REGISTERED

## 2025-06-09 PROCEDURE — 7100000009 HC PHASE TWO TIME - INITIAL BASE CHARGE

## 2025-06-09 PROCEDURE — 92960 CARDIOVERSION ELECTRIC EXT: CPT | Mod: 59

## 2025-06-09 PROCEDURE — A92960 PR CARDIOVERSION, ELECTIVE;EXTERN: Performed by: ANESTHESIOLOGY

## 2025-06-09 PROCEDURE — 2500000005 HC RX 250 GENERAL PHARMACY W/O HCPCS: Performed by: NURSE PRACTITIONER

## 2025-06-09 PROCEDURE — RXMED WILLOW AMBULATORY MEDICATION CHARGE

## 2025-06-09 PROCEDURE — 2500000001 HC RX 250 WO HCPCS SELF ADMINISTERED DRUGS (ALT 637 FOR MEDICARE OP): Performed by: NURSE PRACTITIONER

## 2025-06-09 PROCEDURE — 7100000010 HC PHASE TWO TIME - EACH INCREMENTAL 1 MINUTE

## 2025-06-09 PROCEDURE — 3700000001 HC GENERAL ANESTHESIA TIME - INITIAL BASE CHARGE

## 2025-06-09 PROCEDURE — 99100 ANES PT EXTEME AGE<1 YR&>70: CPT | Performed by: ANESTHESIOLOGY

## 2025-06-09 PROCEDURE — 2720000007 HC OR 272 NO HCPCS

## 2025-06-09 PROCEDURE — 2500000004 HC RX 250 GENERAL PHARMACY W/ HCPCS (ALT 636 FOR OP/ED): Performed by: NURSE ANESTHETIST, CERTIFIED REGISTERED

## 2025-06-09 PROCEDURE — 99223 1ST HOSP IP/OBS HIGH 75: CPT | Performed by: NURSE PRACTITIONER

## 2025-06-09 PROCEDURE — 3700000002 HC GENERAL ANESTHESIA TIME - EACH INCREMENTAL 1 MINUTE

## 2025-06-09 RX ORDER — HYDRALAZINE HYDROCHLORIDE 20 MG/ML
5 INJECTION INTRAMUSCULAR; INTRAVENOUS EVERY 30 MIN PRN
OUTPATIENT
Start: 2025-06-09

## 2025-06-09 RX ORDER — DILTIAZEM HYDROCHLORIDE 120 MG/1
120 CAPSULE, COATED, EXTENDED RELEASE ORAL DAILY
Qty: 90 CAPSULE | Refills: 3 | Status: SHIPPED | OUTPATIENT
Start: 2025-06-09 | End: 2026-06-09

## 2025-06-09 RX ORDER — LIDOCAINE HYDROCHLORIDE 10 MG/ML
0.1 INJECTION, SOLUTION INFILTRATION; PERINEURAL ONCE
OUTPATIENT
Start: 2025-06-09 | End: 2025-06-09

## 2025-06-09 RX ORDER — ALBUTEROL SULFATE 0.83 MG/ML
2.5 SOLUTION RESPIRATORY (INHALATION) ONCE AS NEEDED
OUTPATIENT
Start: 2025-06-09

## 2025-06-09 RX ORDER — DILTIAZEM HYDROCHLORIDE 120 MG/1
120 CAPSULE, COATED, EXTENDED RELEASE ORAL DAILY
Qty: 30 CAPSULE | Refills: 11 | Status: SHIPPED | OUTPATIENT
Start: 2025-06-09 | End: 2025-06-09

## 2025-06-09 RX ORDER — PROPOFOL 10 MG/ML
INJECTION, EMULSION INTRAVENOUS AS NEEDED
Status: DISCONTINUED | OUTPATIENT
Start: 2025-06-09 | End: 2025-06-09

## 2025-06-09 RX ORDER — ONDANSETRON HYDROCHLORIDE 2 MG/ML
4 INJECTION, SOLUTION INTRAVENOUS ONCE AS NEEDED
OUTPATIENT
Start: 2025-06-09

## 2025-06-09 RX ORDER — SODIUM CHLORIDE, SODIUM LACTATE, POTASSIUM CHLORIDE, CALCIUM CHLORIDE 600; 310; 30; 20 MG/100ML; MG/100ML; MG/100ML; MG/100ML
100 INJECTION, SOLUTION INTRAVENOUS CONTINUOUS
OUTPATIENT
Start: 2025-06-09 | End: 2025-06-10

## 2025-06-09 RX ADMIN — PROPOFOL 20 MG: 10 INJECTION, EMULSION INTRAVENOUS at 11:25

## 2025-06-09 RX ADMIN — APIXABAN 5 MG: 5 TABLET, FILM COATED ORAL at 10:23

## 2025-06-09 RX ADMIN — SODIUM CHLORIDE: 9 INJECTION, SOLUTION INTRAVENOUS at 11:15

## 2025-06-09 RX ADMIN — PROPOFOL 60 MG: 10 INJECTION, EMULSION INTRAVENOUS at 11:24

## 2025-06-09 RX ADMIN — Medication 2 L/MIN: at 10:48

## 2025-06-09 SDOH — HEALTH STABILITY: MENTAL HEALTH: CURRENT SMOKER: 0

## 2025-06-09 ASSESSMENT — ENCOUNTER SYMPTOMS
RESPIRATORY NEGATIVE: 1
ALLERGIC/IMMUNOLOGIC NEGATIVE: 1
ENDOCRINE NEGATIVE: 1
NEUROLOGICAL NEGATIVE: 1
CONSTITUTIONAL NEGATIVE: 1
FATIGUE: 0
GASTROINTESTINAL NEGATIVE: 1
EYES NEGATIVE: 1
MUSCULOSKELETAL NEGATIVE: 1
PSYCHIATRIC NEGATIVE: 1
SHORTNESS OF BREATH: 0
PALPITATIONS: 1
HEMATOLOGIC/LYMPHATIC NEGATIVE: 1

## 2025-06-09 ASSESSMENT — COLUMBIA-SUICIDE SEVERITY RATING SCALE - C-SSRS
2. HAVE YOU ACTUALLY HAD ANY THOUGHTS OF KILLING YOURSELF?: NO
1. IN THE PAST MONTH, HAVE YOU WISHED YOU WERE DEAD OR WISHED YOU COULD GO TO SLEEP AND NOT WAKE UP?: NO
6. HAVE YOU EVER DONE ANYTHING, STARTED TO DO ANYTHING, OR PREPARED TO DO ANYTHING TO END YOUR LIFE?: NO

## 2025-06-09 ASSESSMENT — PAIN - FUNCTIONAL ASSESSMENT: PAIN_FUNCTIONAL_ASSESSMENT: 0-10

## 2025-06-09 ASSESSMENT — PAIN SCALES - GENERAL: PAINLEVEL_OUTOF10: 0 - NO PAIN

## 2025-06-09 NOTE — ANESTHESIA PREPROCEDURE EVALUATION
Patient: Kate Bearden    Procedure Information       Date/Time: 06/09/25 1100    Scheduled providers: Paul Pope MD; Jim Castañeda MD    Procedure: CARDIOVERSION EXTERNAL    Location: [facility]            Relevant Problems   Cardiac   (+) Atrial fibrillation (Multi)   (+) Benign essential hypertension   (+) Chest pain   (+) Pure hypercholesterolemia      Musculoskeletal   (+) Primary osteoarthritis of both knees   (+) Primary osteoarthritis of left hip       Clinical information reviewed:   Tobacco  Allergies  Meds   Med Hx  Surg Hx   Fam Hx  Soc Hx        NPO Detail:  NPO/Void Status  Date of Last Liquid: 06/08/25  Time of Last Liquid: 1700  Date of Last Solid: 06/08/25  Time of Last Solid: 1700         Physical Exam    Airway  Mallampati: II  TM distance: >3 FB  Neck ROM: full  Mouth opening: 3 or more finger widths     Cardiovascular   Rhythm: irregular  Rate: abnormal     Dental     (+) upper dentures     Pulmonary Breath sounds clear to auscultation     Abdominal Abdomen: soft  Bowel sounds: normal           Anesthesia Plan    History of general anesthesia?: yes  History of complications of general anesthesia?: no    ASA 3     MAC     The patient is not a current smoker.  Patient was not previously instructed to abstain from smoking on day of procedure.  Patient did not smoke on day of procedure.    intravenous induction   Anesthetic plan and risks discussed with patient.    Plan discussed with attending.

## 2025-06-09 NOTE — DISCHARGE INSTRUCTIONS
*****Please do not miss any doses of your anticoagulant (Eliquis). This is very important to prevent the risk of stroke.  ******               Cardioversion           Cardioversion is a non-surgical way to restore your heart's normal rhythm. Medication may be tried first to correct an irregular heartbeat, but if medicines do not work, electrical cardioversion may be needed. The electrical current should make your heartbeat normally again.      After the procedure-    Your heart rate, blood pressure and respirations will be checked frequently by the nurse until you are fully alert.      When the patches are removed form your chest, you may notice redness, irritation, or itching similar to a mild sunburn. You may You may use over the counter hydrocortisone 1% cream and apply up to three times a day for any irritation to your skin on your chest.      Discharge information-   Have an adult with you to get you home from the hospital; you will not be allowed to drive for 24 hours after the procedure.      You may resume your usual routine after discharge.      Make sure you and your family understands how you are to take your medications. The doctor will tell you what to do with your cardiac medicines and your anti-coagulation medicines.      There is a small chance your irregular heartbeat may return. If you feel skipped beats, rapid heart rate, or chest tightness, call your doctor.     You have received sedation today, please follow these guidelines   FOR NEXT 24 HOURS  - Upon discharge, you should return home and rest for the remainder of the day and evening. You do not have to stay on bed rest but should not be very active.  It is recommended a responsible adult be with you for the first 24 hours after the procedure.    -Please resume your blood thinning medication the evening of your procedure.      - No driving for 24 hours after procedure.     - Do not drive, operate machinery, or use power tools for 24 hours  after your procedure.     - Do not make any legal decisions for 24 hours after your procedure.     - Do not drink alcoholic beverages for 24 hours after your procedure.

## 2025-06-09 NOTE — ANESTHESIA POSTPROCEDURE EVALUATION
"Patient: Kate Bearden    Procedure Summary       Date: 06/09/25 Room / Location: [facility]    Anesthesia Start: 1115 Anesthesia Stop: 1136    Procedure: CARDIOVERSION EXTERNAL Diagnosis: Atrial fibrillation, unspecified type (Multi)    Scheduled Providers: Paul Pope MD; Jim Castañeda MD Responsible Provider: Jim Castañeda MD    Anesthesia Type: MAC ASA Status: 3            Anesthesia Type: MAC  BP (!) 145/95   Pulse 84   Temp 35.2 °C (95.4 °F)   Resp 16   Ht 1.6 m (5' 3\")   Wt 68 kg (150 lb)   SpO2 95%   BMI 26.57 kg/m²        Anesthesia Post Evaluation    Patient location during evaluation: PACU  Patient participation: complete - patient participated  Level of consciousness: awake  Pain management: adequate  Airway patency: patent  Cardiovascular status: acceptable  Respiratory status: acceptable  Hydration status: acceptable  Postoperative Nausea and Vomiting: none        There were no known notable events for this encounter.    "

## 2025-06-09 NOTE — NURSING NOTE
END OF PROCEDURE MONITORING CRITERIA  01. BP is within +/- 20% of preprocedure  02. Oxygen sat is at 92% or above on RA, or existing order for O2 treatment, or at pre-sedation levels, otherwise new O2 order needed.  03. Unless the patient has a pre-procedure history of diminished level of consciousness, s/he is easily arousable and when aroused is able to responds appropriately for his/her age.  04. Significant complications related to the specific procedure are absent, have been controlled, or have been evaluated, including:      A. Pain.      B. Wound drainage.      C. All drains and tubes are patent.      D. Nausea and Vomiting. Vomiting is not persisten and has not occurred within 15 minutes prior to discharge.      E. Bladder distention. Voided bladder and/or no symptoms or urinary retention (e.g., bladder distention, frequent voiding in small amounts).      F. Neurovascular status.      G. Level of Consciousness consistent with pre procedural status.        son(s)  affirmed that they were driving the patient home.

## 2025-06-09 NOTE — POST-PROCEDURE NOTE
Physician Transition of Care Summary  Invasive Cardiovascular Lab    Procedure Date: 6/9/2025  Attending: * No surgeons found in log *  Resident/Fellow/Other Assistant: * No surgeons found in log *    Indications:   * No Diagnosis Codes entered *    Post-procedure diagnosis:   * No Diagnosis Codes entered *    Procedure(s):   * No procedures documented on diagnosis form *      Procedure Findings:   Patient here for elective cardioversion    Description of the Procedure:   Patient had a synchronized cardioversion done with the anesthesiologist present.  First 50 J shock delivered and patient remained in atrial flutter then 200 J synchronized cardioversion converted to sinus rhythm.    Complications:   None    Stents/Implants:   Implants       No implant documentation for this case.            Anticoagulation/Antiplatelet Plan:   Continue Eliquis and continue Toprol-XL I would suggest change from amlodipine to Cardizem  mg tablet once a day.  Outpatient follow-up with Dr. Saldivar    Estimated Blood Loss:   0 mL    Anesthesia: * No anesthesia type entered * Anesthesia Staff: Anesthesiologist: Jim Castañeda MD  CRNA: DICKSON Small    Any Specimen(s) Removed:   No specimens collected during this procedure.    Disposition:   Discharge home soon      Electronically signed by: Paul Pope MD, 6/9/2025 11:36 AM

## 2025-06-12 LAB
ATRIAL RATE: 113 BPM
ATRIAL RATE: 48 BPM
P AXIS: 71 DEGREES
P OFFSET: 185 MS
P ONSET: 135 MS
PR INTERVAL: 178 MS
Q ONSET: 224 MS
Q ONSET: 225 MS
QRS COUNT: 16 BEATS
QRS COUNT: 8 BEATS
QRS DURATION: 128 MS
QRS DURATION: 130 MS
QT INTERVAL: 380 MS
QT INTERVAL: 486 MS
QTC CALCULATION(BAZETT): 434 MS
QTC CALCULATION(BAZETT): 482 MS
QTC FREDERICIA: 445 MS
QTC FREDERICIA: 451 MS
R AXIS: 66 DEGREES
R AXIS: 70 DEGREES
T AXIS: -11 DEGREES
T AXIS: 14 DEGREES
T OFFSET: 415 MS
T OFFSET: 467 MS
VENTRICULAR RATE: 48 BPM
VENTRICULAR RATE: 97 BPM

## 2025-06-13 DIAGNOSIS — M54.9 BACK PAIN, UNSPECIFIED BACK LOCATION, UNSPECIFIED BACK PAIN LATERALITY, UNSPECIFIED CHRONICITY: ICD-10-CM

## 2025-06-16 ENCOUNTER — OFFICE VISIT (OUTPATIENT)
Dept: PRIMARY CARE | Facility: CLINIC | Age: 80
End: 2025-06-16
Payer: MEDICARE

## 2025-06-16 VITALS
DIASTOLIC BLOOD PRESSURE: 78 MMHG | SYSTOLIC BLOOD PRESSURE: 160 MMHG | WEIGHT: 158 LBS | HEART RATE: 54 BPM | BODY MASS INDEX: 28 KG/M2 | OXYGEN SATURATION: 97 % | HEIGHT: 63 IN

## 2025-06-16 DIAGNOSIS — I10 BENIGN ESSENTIAL HYPERTENSION: Primary | ICD-10-CM

## 2025-06-16 DIAGNOSIS — I48.91 ATRIAL FIBRILLATION, UNSPECIFIED TYPE (MULTI): ICD-10-CM

## 2025-06-16 PROCEDURE — 99214 OFFICE O/P EST MOD 30 MIN: CPT | Performed by: PHYSICIAN ASSISTANT

## 2025-06-16 PROCEDURE — 1036F TOBACCO NON-USER: CPT | Performed by: PHYSICIAN ASSISTANT

## 2025-06-16 PROCEDURE — 3078F DIAST BP <80 MM HG: CPT | Performed by: PHYSICIAN ASSISTANT

## 2025-06-16 PROCEDURE — 1126F AMNT PAIN NOTED NONE PRSNT: CPT | Performed by: PHYSICIAN ASSISTANT

## 2025-06-16 PROCEDURE — 3077F SYST BP >= 140 MM HG: CPT | Performed by: PHYSICIAN ASSISTANT

## 2025-06-16 PROCEDURE — 1159F MED LIST DOCD IN RCRD: CPT | Performed by: PHYSICIAN ASSISTANT

## 2025-06-16 RX ORDER — TRAMADOL HYDROCHLORIDE 50 MG/1
50 TABLET, FILM COATED ORAL EVERY 12 HOURS PRN
Qty: 60 TABLET | Refills: 2 | Status: SHIPPED | OUTPATIENT
Start: 2025-06-16

## 2025-06-16 ASSESSMENT — PAIN SCALES - GENERAL: PAINLEVEL_OUTOF10: 0-NO PAIN

## 2025-06-16 NOTE — TELEPHONE ENCOUNTER
Please send the attached prescription to the patient's pharmacy as soon as possible. Thank you!    Requested Prescriptions     Pending Prescriptions Disp Refills    dilTIAZem XR (Dilacor XR) 120 mg 24 hr capsule [Pharmacy Med Name: DILTIAZEM ER 120MG/24HR CAP (AB2)] 90 capsule 3     Sig: Take 1 capsule (120 mg) by mouth once daily.

## 2025-06-16 NOTE — PROGRESS NOTES
"Subjective   Patient ID: Kate Bearden is a 79 y.o. female who presents for Follow-up.    Kate is seen for follow up -   Has had cardioversion 1 week ago and has had change in medication - amlodipine to diltiazem. Was converted to sinus rhythm from atrial flutter. She denies any major side effects to change in medication. Does not recall the procedure however, presumably due to anesthesia.  She is without dizziness, CP, edema.   Arthritis - stable, uses tramadol prn.          Review of Systems   All other systems reviewed and are negative.      Objective   /78   Pulse 54   Ht 1.6 m (5' 3\")   Wt 71.7 kg (158 lb)   SpO2 97%   BMI 27.99 kg/m²     Physical Exam  Constitutional:       Appearance: Normal appearance.   HENT:      Mouth/Throat:      Pharynx: Oropharynx is clear.   Cardiovascular:      Rate and Rhythm: Regular rhythm. Bradycardia present.   Musculoskeletal:      Right lower leg: No edema.      Left lower leg: No edema.   Neurological:      Mental Status: She is alert.         Assessment/Plan   Diagnoses and all orders for this visit:  Benign essential hypertension  Atrial fibrillation, unspecified type (Multi)    Tramadol refilled today.   Continue with cardiology follow up.   Follow up 3 months.       "
venodynes, early ambulation

## 2025-06-17 ENCOUNTER — APPOINTMENT (OUTPATIENT)
Dept: CARDIOLOGY | Facility: CLINIC | Age: 80
End: 2025-06-17
Payer: MEDICARE

## 2025-06-17 RX ORDER — DILTIAZEM HYDROCHLORIDE 120 MG/1
120 CAPSULE, EXTENDED RELEASE ORAL DAILY
Qty: 90 CAPSULE | Refills: 3 | Status: SHIPPED | OUTPATIENT
Start: 2025-06-17

## 2025-07-22 ENCOUNTER — APPOINTMENT (OUTPATIENT)
Dept: CARDIOLOGY | Facility: CLINIC | Age: 80
End: 2025-07-22
Payer: MEDICARE

## 2025-07-22 VITALS
BODY MASS INDEX: 27.99 KG/M2 | WEIGHT: 158 LBS | DIASTOLIC BLOOD PRESSURE: 70 MMHG | OXYGEN SATURATION: 98 % | HEART RATE: 58 BPM | RESPIRATION RATE: 16 BRPM | SYSTOLIC BLOOD PRESSURE: 142 MMHG

## 2025-07-22 DIAGNOSIS — I10 PRIMARY HYPERTENSION: Primary | ICD-10-CM

## 2025-07-22 DIAGNOSIS — I48.0 PAROXYSMAL ATRIAL FIBRILLATION (MULTI): ICD-10-CM

## 2025-07-22 DIAGNOSIS — E78.00 PURE HYPERCHOLESTEROLEMIA: ICD-10-CM

## 2025-07-22 DIAGNOSIS — I10 BENIGN ESSENTIAL HYPERTENSION: ICD-10-CM

## 2025-07-22 DIAGNOSIS — R07.2 PRECORDIAL PAIN: ICD-10-CM

## 2025-07-22 PROCEDURE — 99214 OFFICE O/P EST MOD 30 MIN: CPT | Performed by: INTERNAL MEDICINE

## 2025-07-22 PROCEDURE — 1159F MED LIST DOCD IN RCRD: CPT | Performed by: INTERNAL MEDICINE

## 2025-07-22 PROCEDURE — 3077F SYST BP >= 140 MM HG: CPT | Performed by: INTERNAL MEDICINE

## 2025-07-22 PROCEDURE — 1160F RVW MEDS BY RX/DR IN RCRD: CPT | Performed by: INTERNAL MEDICINE

## 2025-07-22 PROCEDURE — 3078F DIAST BP <80 MM HG: CPT | Performed by: INTERNAL MEDICINE

## 2025-07-22 PROCEDURE — 1126F AMNT PAIN NOTED NONE PRSNT: CPT | Performed by: INTERNAL MEDICINE

## 2025-07-22 RX ORDER — CHLORTHALIDONE 25 MG/1
12.5 TABLET ORAL DAILY
Qty: 15 TABLET | Refills: 5 | Status: SHIPPED | OUTPATIENT
Start: 2025-07-22 | End: 2026-01-18

## 2025-07-22 ASSESSMENT — PAIN SCALES - GENERAL: PAINLEVEL_OUTOF10: 0-NO PAIN

## 2025-07-22 ASSESSMENT — LIFESTYLE VARIABLES
SKIP TO QUESTIONS 9-10: 1
HOW MANY STANDARD DRINKS CONTAINING ALCOHOL DO YOU HAVE ON A TYPICAL DAY: PATIENT DOES NOT DRINK
AUDIT-C TOTAL SCORE: 0
AUDIT TOTAL SCORE: 0
HOW OFTEN DO YOU HAVE A DRINK CONTAINING ALCOHOL: NEVER
HAVE YOU OR SOMEONE ELSE BEEN INJURED AS A RESULT OF YOUR DRINKING: NO
HAS A RELATIVE, FRIEND, DOCTOR, OR ANOTHER HEALTH PROFESSIONAL EXPRESSED CONCERN ABOUT YOUR DRINKING OR SUGGESTED YOU CUT DOWN: NO
HOW OFTEN DO YOU HAVE SIX OR MORE DRINKS ON ONE OCCASION: NEVER

## 2025-07-22 ASSESSMENT — ENCOUNTER SYMPTOMS
LOSS OF SENSATION IN FEET: 0
OCCASIONAL FEELINGS OF UNSTEADINESS: 0
DEPRESSION: 0

## 2025-07-22 NOTE — PROGRESS NOTES
[facility]    Interventional Cardiology    History of present illness:  This is a very pleasant 79-year-old female with no prior cardiac history except for heart murmur.  Returns to my office after cardioversion.  Patient feeling overall okay.  Denies any chest pain or shortness of breath no palpitation dizziness lightheadedness or syncope.    Medical History[1]    Surgical History[2]    Allergies[3]     reports that she has never smoked. She has never used smokeless tobacco. She reports that she does not drink alcohol and does not use drugs.    Family History[4]    Patient's Medications   New Prescriptions    No medications on file   Previous Medications    ALENDRONATE (FOSAMAX) 70 MG TABLET    TAKE 1 TABLET BY MOUTH 30 MINUTES BEFORE THE 1ST FOOD, BEVERAGE OR MEDICINE OF THE DAY WITH PLAIN WATER AS DIRECTED    APIXABAN (ELIQUIS) 5 MG TABLET    Take 1 tablet (5 mg) by mouth 2 times a day.    ATORVASTATIN (LIPITOR) 40 MG TABLET    Take 1 tablet (40 mg) by mouth once daily.    DILTIAZEM CD (CARDIZEM CD) 120 MG 24 HR CAPSULE    Take 1 capsule (120 mg) by mouth once daily.    DILTIAZEM XR (DILACOR XR) 120 MG 24 HR CAPSULE    Take 1 capsule (120 mg) by mouth once daily.    METOPROLOL SUCCINATE XL (TOPROL-XL) 50 MG 24 HR TABLET    Take 1 tablet (50 mg) by mouth once daily. Do not crush or chew.    TRAMADOL (ULTRAM) 50 MG TABLET    Take 1 tablet (50 mg) by mouth every 12 hours if needed for severe pain (7 - 10) or moderate pain (4 - 6).   Modified Medications    No medications on file   Discontinued Medications    No medications on file       Objective   Physical Exam  General: Patient in no acute distress   HEENT: Atraumatic normocephalic.  Neck: Supple, jugular venous pressure within normal limit.  No bruits  Lungs: Clear to auscultation bilaterally  Cardiovascular: Regular rate and rhythm, normal heart sounds, no murmurs rubs or gallops  Abdomen: Soft nontender  nondistended.  Normal bowel sounds.  Extremities: Warm to touch, no edema.    Lab Review   Hospital Outpatient Visit on 06/09/2025   Component Date Value    Ventricular Rate 06/09/2025 97     Atrial Rate 06/09/2025 113     QRS Duration 06/09/2025 128     QT Interval 06/09/2025 380     QTC Calculation(Bazett) 06/09/2025 482     R Axis 06/09/2025 70     T [place] 06/09/2025 -11     QRS Count 06/09/2025 16     Q Onset 06/09/2025 225     T Offset 06/09/2025 415     QTC Fredericia 06/09/2025 445     Ventricular Rate 06/09/2025 48     Atrial Rate 06/09/2025 48     CA Interval 06/09/2025 178     QRS Duration 06/09/2025 130     QT Interval 06/09/2025 486     QTC Calculation(Bazett) 06/09/2025 434     P Axis 06/09/2025 71     R [place] 06/09/2025 66     T [place] 06/09/2025 14     QRS Count 06/09/2025 8     Q Onset 06/09/2025 224     P Onset 06/09/2025 135     P Offset 06/09/2025 185     T Offset 06/09/2025 467     QTC Fredericia 06/09/2025 451         Assessment/Plan   Problem List[5]   This is a very pleasant 79-year-old female with no prior cardiac history except for heart murmur.  Returns to my office after cardioversion.  Patient feeling overall okay.  Denies any chest pain or shortness of breath no palpitation dizziness lightheadedness or syncope.    Her heart rate is very low she was started on diltiazem and addition of beta-blockers by Dr. Ppoe.  Dr. Pope did cardioversion.  I will stop diltiazem.  Will start on hydrochlorothiazide in addition of metoprolol and Eliquis.  We will obtain an echocardiogram as well as stress test discussed at length with the patient importance of having these 2 procedures done.  Continue strictly oral anticoagulation with Eliquis.  Will follow-up in 3 months.    Jose Saldivar MD              [1] History reviewed. No pertinent past medical history.  [2]   Past Surgical History:  Procedure Laterality Date    TOTAL KNEE ARTHROPLASTY Right 07/01/2019   [3] No Known Allergies  [4] No family history  on file.  [5]   Patient Active Problem List  Diagnosis    Back pain    Primary osteoarthritis of both knees    Benign essential hypertension    Pure hypercholesterolemia    Atrial fibrillation (Multi)    Chest pain    Primary osteoarthritis of left hip    Left hip pain

## 2025-07-22 NOTE — PATIENT INSTRUCTIONS
Stop diltiazem.    Start chlorthalidone 25 mg half tablet daily.  Monitor blood pressure.  If your blood pressure remains elevated more than 130 systolic then you can take full tablet today.    Continue Eliquis,  atorvastatin, metoprolol succinate 50 mg oral daily.    Call centralized scheduling for echocardiogram and stress test.    I will see you in 3 months if the stress test

## 2025-07-31 ENCOUNTER — TELEPHONE (OUTPATIENT)
Dept: CARDIOLOGY | Facility: CLINIC | Age: 80
End: 2025-07-31
Payer: MEDICARE

## 2025-07-31 NOTE — TELEPHONE ENCOUNTER
Kate called in and left a message requesting refill on  Chlorthalidone, metoprolol, and  atorvastatin . Called [facility] pharmacy and she has refills available. Call placed to Kate to let her know of refills. She will call pharmacy.

## 2025-08-05 ENCOUNTER — APPOINTMENT (OUTPATIENT)
Dept: CARDIOLOGY | Facility: CLINIC | Age: 80
End: 2025-08-05
Payer: MEDICARE

## 2025-08-25 ENCOUNTER — OFFICE VISIT (OUTPATIENT)
Dept: PAIN MEDICINE | Facility: CLINIC | Age: 80
End: 2025-08-25
Payer: MEDICARE

## 2025-08-25 VITALS — HEART RATE: 80 BPM | SYSTOLIC BLOOD PRESSURE: 128 MMHG | DIASTOLIC BLOOD PRESSURE: 78 MMHG | RESPIRATION RATE: 20 BRPM

## 2025-08-25 DIAGNOSIS — M16.12 PRIMARY OSTEOARTHRITIS OF LEFT HIP: Primary | ICD-10-CM

## 2025-08-25 DIAGNOSIS — M25.552 LEFT HIP PAIN: ICD-10-CM

## 2025-08-25 PROCEDURE — 3074F SYST BP LT 130 MM HG: CPT | Performed by: ANESTHESIOLOGY

## 2025-08-25 PROCEDURE — 1159F MED LIST DOCD IN RCRD: CPT | Performed by: ANESTHESIOLOGY

## 2025-08-25 PROCEDURE — 1160F RVW MEDS BY RX/DR IN RCRD: CPT | Performed by: ANESTHESIOLOGY

## 2025-08-25 PROCEDURE — 1126F AMNT PAIN NOTED NONE PRSNT: CPT | Performed by: ANESTHESIOLOGY

## 2025-08-25 PROCEDURE — 99212 OFFICE O/P EST SF 10 MIN: CPT

## 2025-08-25 PROCEDURE — G2211 COMPLEX E/M VISIT ADD ON: HCPCS | Performed by: ANESTHESIOLOGY

## 2025-08-25 PROCEDURE — 1036F TOBACCO NON-USER: CPT | Performed by: ANESTHESIOLOGY

## 2025-08-25 PROCEDURE — 3078F DIAST BP <80 MM HG: CPT | Performed by: ANESTHESIOLOGY

## 2025-08-25 PROCEDURE — 99213 OFFICE O/P EST LOW 20 MIN: CPT | Performed by: ANESTHESIOLOGY

## 2025-08-25 ASSESSMENT — ENCOUNTER SYMPTOMS
GASTROINTESTINAL NEGATIVE: 1
PSYCHIATRIC NEGATIVE: 1
CONSTITUTIONAL NEGATIVE: 1
RESPIRATORY NEGATIVE: 1
ENDOCRINE NEGATIVE: 1
EYES NEGATIVE: 1
HEMATOLOGIC/LYMPHATIC NEGATIVE: 1
ARTHRALGIAS: 1
CARDIOVASCULAR NEGATIVE: 1
NEUROLOGICAL NEGATIVE: 1

## 2025-08-25 ASSESSMENT — PAIN SCALES - GENERAL: PAINLEVEL_OUTOF10: 0-NO PAIN

## 2025-08-25 ASSESSMENT — PAIN - FUNCTIONAL ASSESSMENT: PAIN_FUNCTIONAL_ASSESSMENT: NO/DENIES PAIN

## 2025-08-26 ENCOUNTER — HOSPITAL ENCOUNTER (OUTPATIENT)
Dept: CARDIOLOGY | Facility: HOSPITAL | Age: 80
Discharge: HOME | End: 2025-08-26
Payer: MEDICARE

## 2025-08-26 DIAGNOSIS — R06.02 SHORTNESS OF BREATH: ICD-10-CM

## 2025-08-26 DIAGNOSIS — I48.91 ATRIAL FIBRILLATION, UNSPECIFIED TYPE (MULTI): ICD-10-CM

## 2025-08-26 DIAGNOSIS — I10 PRIMARY HYPERTENSION: ICD-10-CM

## 2025-08-26 DIAGNOSIS — R07.2 PRECORDIAL PAIN: ICD-10-CM

## 2025-08-26 LAB
AORTIC VALVE MEAN GRADIENT: 4 MMHG
AORTIC VALVE PEAK VELOCITY: 1.42 M/S
AV PEAK GRADIENT: 8 MMHG
AVA (PEAK VEL): 3.85 CM2
AVA (VTI): 3.56 CM2
EJECTION FRACTION APICAL 4 CHAMBER: 65.6
EJECTION FRACTION: 63 %
LEFT ATRIUM VOLUME AREA LENGTH INDEX BSA: 54.2 ML/M2
LEFT VENTRICLE INTERNAL DIMENSION DIASTOLE: 4.72 CM (ref 3.5–6)
LEFT VENTRICULAR OUTFLOW TRACT DIAMETER: 2.2 CM
LV EJECTION FRACTION BIPLANE: 66 %
MITRAL VALVE E/A RATIO: 1.53
RIGHT VENTRICLE FREE WALL PEAK S': 12.8 CM/S
RIGHT VENTRICLE PEAK SYSTOLIC PRESSURE: 33 MMHG
TRICUSPID ANNULAR PLANE SYSTOLIC EXCURSION: 2.3 CM

## 2025-08-26 PROCEDURE — 93306 TTE W/DOPPLER COMPLETE: CPT | Performed by: INTERNAL MEDICINE

## 2025-08-26 PROCEDURE — 93306 TTE W/DOPPLER COMPLETE: CPT

## 2025-11-03 ENCOUNTER — APPOINTMENT (OUTPATIENT)
Dept: CARDIOLOGY | Facility: CLINIC | Age: 80
End: 2025-11-03
Payer: MEDICARE